# Patient Record
Sex: FEMALE | Race: BLACK OR AFRICAN AMERICAN | NOT HISPANIC OR LATINO | Employment: OTHER | URBAN - NONMETROPOLITAN AREA
[De-identification: names, ages, dates, MRNs, and addresses within clinical notes are randomized per-mention and may not be internally consistent; named-entity substitution may affect disease eponyms.]

---

## 2017-02-13 ENCOUNTER — OFFICE VISIT (OUTPATIENT)
Dept: OPHTHALMOLOGY | Facility: CLINIC | Age: 67
End: 2017-02-13

## 2017-02-13 DIAGNOSIS — Z96.1 PSEUDOPHAKIA: ICD-10-CM

## 2017-02-13 DIAGNOSIS — E11.9 DIABETES MELLITUS WITHOUT COMPLICATION (HCC): Primary | ICD-10-CM

## 2017-02-13 PROCEDURE — 92014 COMPRE OPH EXAM EST PT 1/>: CPT | Performed by: OPHTHALMOLOGY

## 2017-02-13 RX ORDER — VERAPAMIL HYDROCHLORIDE 40 MG/1
80 TABLET ORAL 3 TIMES DAILY
Refills: 1 | COMMUNITY
Start: 2017-01-20

## 2017-02-13 RX ORDER — NITROGLYCERIN 0.4 MG/1
0.4 TABLET SUBLINGUAL
Refills: 11 | COMMUNITY
Start: 2017-01-20

## 2017-02-13 NOTE — PROGRESS NOTES
Subjective   Laura West is a 67 y.o. female.   Chief Complaint   Patient presents with   • Diabetic Eye Exam   • Pseudophakia     HPI     Diabetic Eye Exam   Associated symptoms: Negative for blurred vision   Duration: years   Blood Sugars: is controlled       Last edited by Theodore Osorio MD on 2/13/2017 10:37 AM. (History)          Review of Systems    Objective   Visual Acuity (Snellen - Linear)      Right Left   Dist cc 20/40 -1 20/60   Near cc J1 J3       Correction:  Glasses         Wearing Rx      Sphere Cylinder Axis Add   Right [place]  180 +2.50   Left -1.25 +0.50 149 +2.50           Manifest Refraction      Sphere Cylinder Axis Dist Add   Right +0.75 Sphere  20/25 +2.50   Left [place] +0.50 060 20/25 +2.50            Final Rx      Sphere Cylinder Axis Add   Right +0.75 Sphere  +2.50   Left [place] +0.50 060 +2.50            Pupils      Pupils   Right PERRL   Left PERRL           Confrontational Visual Fields     Visual Fields      Left Right   Result Full Full                  Extraocular Movement      Right Left   Result Full Full                 Main Ophthalmology Exam     External Exam      Right Left    External Normal Normal      Slit Lamp Exam      Right Left    Lids/Lashes Normal Normal    Conjunctiva/Sclera White and quiet White and quiet    Cornea Clear Clear    Anterior Chamber Deep and quiet Deep and quiet    Iris Round and reactive Round and reactive    Lens Posterior chamber intraocular lens Posterior chamber intraocular lens    Vitreous Normal Asteroid hyalosis      Fundus Exam      Right Left    Disc Normal Normal    Macula Normal Normal    Vessels Normal Normal    Periphery Normal Normal                Assessment/Plan   Diagnoses and all orders for this visit:    Diabetes mellitus without complication    Pseudophakia    Eye disease risks with diabetes discussed  Glasses Rx given per refraction         Return in about 1 year (around 2/13/2018).

## 2017-09-13 ENCOUNTER — HOSPITAL ENCOUNTER (EMERGENCY)
Facility: HOSPITAL | Age: 67
Discharge: HOME | End: 2017-09-13
Attending: EMERGENCY MEDICINE | Admitting: EMERGENCY MEDICINE

## 2017-09-13 ENCOUNTER — APPOINTMENT (OUTPATIENT)
Dept: GENERAL RADIOLOGY | Facility: HOSPITAL | Age: 67
End: 2017-09-13

## 2017-09-13 VITALS
HEIGHT: 70 IN | HEART RATE: 76 BPM | RESPIRATION RATE: 18 BRPM | BODY MASS INDEX: 41.95 KG/M2 | DIASTOLIC BLOOD PRESSURE: 82 MMHG | SYSTOLIC BLOOD PRESSURE: 138 MMHG | TEMPERATURE: 98.7 F | WEIGHT: 293 LBS | OXYGEN SATURATION: 94 %

## 2017-09-13 DIAGNOSIS — K56.7 ILEUS (HCC): ICD-10-CM

## 2017-09-13 DIAGNOSIS — K59.04 CHRONIC IDIOPATHIC CONSTIPATION: Primary | ICD-10-CM

## 2017-09-13 LAB
ALBUMIN SERPL-MCNC: 4.4 G/DL (ref 3.4–4.8)
ALBUMIN/GLOB SERPL: 1.3 G/DL (ref 1.1–1.8)
ALP SERPL-CCNC: 73 U/L (ref 38–126)
ALT SERPL W P-5'-P-CCNC: 45 U/L (ref 9–52)
ANION GAP SERPL CALCULATED.3IONS-SCNC: 10 MMOL/L (ref 5–15)
AST SERPL-CCNC: 39 U/L (ref 14–36)
BASOPHILS # BLD AUTO: 0.01 10*3/MM3 (ref 0–0.2)
BASOPHILS NFR BLD AUTO: 0.1 % (ref 0–2)
BILIRUB SERPL-MCNC: 0.7 MG/DL (ref 0.2–1.3)
BUN BLD-MCNC: 21 MG/DL (ref 7–21)
BUN/CREAT SERPL: 18.9 (ref 7–25)
CALCIUM SPEC-SCNC: 9.5 MG/DL (ref 8.4–10.2)
CHLORIDE SERPL-SCNC: 99 MMOL/L (ref 95–110)
CO2 SERPL-SCNC: 29 MMOL/L (ref 22–31)
CREAT BLD-MCNC: 1.11 MG/DL (ref 0.5–1)
DEPRECATED RDW RBC AUTO: 48.2 FL (ref 36.4–46.3)
EOSINOPHIL # BLD AUTO: 0.08 10*3/MM3 (ref 0–0.7)
EOSINOPHIL NFR BLD AUTO: 0.7 % (ref 0–7)
ERYTHROCYTE [DISTWIDTH] IN BLOOD BY AUTOMATED COUNT: 15.2 % (ref 11.5–14.5)
GFR SERPL CREATININE-BSD FRML MDRD: 60 ML/MIN/1.73 (ref 45–104)
GLOBULIN UR ELPH-MCNC: 3.5 GM/DL (ref 2.3–3.5)
GLUCOSE BLD-MCNC: 120 MG/DL (ref 60–100)
HCT VFR BLD AUTO: 38.3 % (ref 35–45)
HGB BLD-MCNC: 12.5 G/DL (ref 12–15.5)
HOLD SPECIMEN: NORMAL
HOLD SPECIMEN: NORMAL
IMM GRANULOCYTES # BLD: 0.03 10*3/MM3 (ref 0–0.02)
IMM GRANULOCYTES NFR BLD: 0.3 % (ref 0–0.5)
LIPASE SERPL-CCNC: 28 U/L (ref 23–300)
LYMPHOCYTES # BLD AUTO: 0.7 10*3/MM3 (ref 0.6–4.2)
LYMPHOCYTES NFR BLD AUTO: 6.5 % (ref 10–50)
MCH RBC QN AUTO: 28.2 PG (ref 26.5–34)
MCHC RBC AUTO-ENTMCNC: 32.6 G/DL (ref 31.4–36)
MCV RBC AUTO: 86.3 FL (ref 80–98)
MONOCYTES # BLD AUTO: 0.67 10*3/MM3 (ref 0–0.9)
MONOCYTES NFR BLD AUTO: 6.3 % (ref 0–12)
NEUTROPHILS # BLD AUTO: 9.2 10*3/MM3 (ref 2–8.6)
NEUTROPHILS NFR BLD AUTO: 86.1 % (ref 37–80)
PLATELET # BLD AUTO: 147 10*3/MM3 (ref 150–450)
PMV BLD AUTO: 10.2 FL (ref 8–12)
POTASSIUM BLD-SCNC: 3.7 MMOL/L (ref 3.5–5.1)
PROT SERPL-MCNC: 7.9 G/DL (ref 6.3–8.6)
RBC # BLD AUTO: 4.44 10*6/MM3 (ref 3.77–5.16)
SODIUM BLD-SCNC: 138 MMOL/L (ref 137–145)
WBC NRBC COR # BLD: 10.69 10*3/MM3 (ref 3.2–9.8)
WHOLE BLOOD HOLD SPECIMEN: NORMAL
WHOLE BLOOD HOLD SPECIMEN: NORMAL

## 2017-09-13 PROCEDURE — 83690 ASSAY OF LIPASE: CPT | Performed by: EMERGENCY MEDICINE

## 2017-09-13 PROCEDURE — 80053 COMPREHEN METABOLIC PANEL: CPT | Performed by: EMERGENCY MEDICINE

## 2017-09-13 PROCEDURE — 85025 COMPLETE CBC W/AUTO DIFF WBC: CPT | Performed by: EMERGENCY MEDICINE

## 2017-09-13 PROCEDURE — 99284 EMERGENCY DEPT VISIT MOD MDM: CPT

## 2017-09-13 PROCEDURE — 96361 HYDRATE IV INFUSION ADD-ON: CPT

## 2017-09-13 PROCEDURE — 74020 HC XR ABDOMEN FLAT & UPRIGHT: CPT

## 2017-09-13 PROCEDURE — 96360 HYDRATION IV INFUSION INIT: CPT

## 2017-09-13 RX ORDER — SODIUM CHLORIDE 0.9 % (FLUSH) 0.9 %
10 SYRINGE (ML) INJECTION AS NEEDED
Status: DISCONTINUED | OUTPATIENT
Start: 2017-09-13 | End: 2017-09-14 | Stop reason: HOSPADM

## 2017-09-13 RX ORDER — HYDROCODONE BITARTRATE AND ACETAMINOPHEN 5; 325 MG/1; MG/1
1 TABLET ORAL EVERY 6 HOURS PRN
COMMUNITY

## 2017-09-13 RX ORDER — POLYETHYLENE GLYCOL 3350 17 G/17G
17 POWDER, FOR SOLUTION ORAL DAILY
Qty: 578 G | Refills: 0 | Status: SHIPPED | OUTPATIENT
Start: 2017-09-13

## 2017-09-13 RX ORDER — SODIUM CHLORIDE 9 MG/ML
1000 INJECTION, SOLUTION INTRAVENOUS ONCE
Status: COMPLETED | OUTPATIENT
Start: 2017-09-13 | End: 2017-09-13

## 2017-09-13 RX ORDER — POTASSIUM CHLORIDE 750 MG/1
10 TABLET, FILM COATED, EXTENDED RELEASE ORAL DAILY
COMMUNITY

## 2017-09-13 RX ORDER — METOCLOPRAMIDE 5 MG/1
5 TABLET ORAL 3 TIMES DAILY PRN
Qty: 20 TABLET | Refills: 0 | Status: SHIPPED | OUTPATIENT
Start: 2017-09-13

## 2017-09-13 RX ORDER — EZETIMIBE 10 MG/1
10 TABLET ORAL DAILY
COMMUNITY

## 2017-09-13 RX ADMIN — SODIUM CHLORIDE 1000 ML: 900 INJECTION, SOLUTION INTRAVENOUS at 20:36

## 2017-09-14 NOTE — ED PROVIDER NOTES
Subjective   HPI Comments: Patient notes constipation and a lower abdominal pain and constipation for three days.  Patient notes recent dietary changes and inability to stool.  States the stool is there but she cannot clear it.  Notes she can usually pass her stool, but she felt today she was dizzy and presyncopal with attempts to pass stool.  Became somewhat diaphroetic during the attempt.  No cp/sob.  Has hx of hernia problems for which she has had surgeries.  Has not had a bowel obstruction.        History provided by:  Patient   used: No        Review of Systems   Constitutional: Positive for diaphoresis. Negative for appetite change, chills and fever.   HENT: Negative.  Negative for congestion.    Eyes: Negative.  Negative for photophobia and visual disturbance.   Respiratory: Negative.  Negative for cough, chest tightness and shortness of breath.    Cardiovascular: Negative.  Negative for chest pain and palpitations.   Gastrointestinal: Positive for abdominal pain, constipation and nausea. Negative for diarrhea and vomiting.   Endocrine: Negative.    Genitourinary: Negative.  Negative for decreased urine volume, dysuria, flank pain and hematuria.   Musculoskeletal: Negative.  Negative for arthralgias, back pain, myalgias, neck pain and neck stiffness.   Skin: Negative.  Negative for pallor.   Neurological: Positive for dizziness and syncope. Negative for weakness, light-headedness, numbness and headaches.   Psychiatric/Behavioral: Negative.  Negative for confusion and suicidal ideas. The patient is not nervous/anxious.    All other systems reviewed and are negative.      Past Medical History:   Diagnosis Date   • Acute anterior uveitis     OD, improved      • Acute bronchitis    • Acute gouty arthropathy     left 1st MTP joint    • Acute sinusitis    • Artificial lens present     IN POSITION - OU   • Benign neoplasm of skin of eyelid      s/p excision hidrocystoma RLL   • Cellulitis     rt  distal arm mild      • Common cold    • Contusion    • Cortical senile cataract     OS   • Cough    • Dysuria    • Gout    • Gouty arthropathy    • Hand pain    • Hyperlipidemia    • Hypertension    • Knee pain    • Posterior subcapsular polar age-related cataract     OS   • Type 2 diabetes with complication    • Vertigo    • Vitreous floaters     asteroid hyalosis OS    • Vitreous opacities     asteroid OS          Allergies   Allergen Reactions   • Iodinated Diagnostic Agents      SHAKING   • Tamsulosin Other (See Comments)     Pt stated that she passes out when taking this medication.       Past Surgical History:   Procedure Laterality Date   • CATARACT EXTRACTION  07/24/2014    Remove cataract, insert lens (Left eye.)   • CATARACT EXTRACTION  06/12/2014    Remove cataract, insert lens (Right eye.)   • CATARACT EXTRACTION Bilateral    • EXCISION LESION  07/11/2014    EYELID EXCISION LESION 71919 (Benign neoplasm of skin of eyelid)    • INJECTION OF MEDICATION  03/09/2012    KENALOG   • OTHER SURGICAL HISTORY  07/17/2014    OPTICAL BIOMETRY 07212 (1)          History reviewed. No pertinent family history.    Social History     Social History   • Marital status:      Spouse name: N/A   • Number of children: N/A   • Years of education: N/A     Social History Main Topics   • Smoking status: Never Smoker   • Smokeless tobacco: None   • Alcohol use No   • Drug use: No   • Sexual activity: Defer     Other Topics Concern   • None     Social History Narrative   • None           Objective   Physical Exam   Constitutional: She is oriented to person, place, and time. She appears well-developed and well-nourished. No distress.   HENT:   Head: Normocephalic and atraumatic.   Nose: Nose normal.   Mouth/Throat: Oropharynx is clear and moist.   Eyes: Conjunctivae and EOM are normal. No scleral icterus.   Neck: Normal range of motion. Neck supple. No JVD present.   Cardiovascular: Normal rate, regular rhythm, normal heart  sounds and intact distal pulses.  Exam reveals no gallop and no friction rub.    No murmur heard.  Pulmonary/Chest: Effort normal. No respiratory distress. She has no wheezes. She has no rales. She exhibits no tenderness.   Abdominal: Soft. She exhibits no distension and no mass. There is tenderness (mild diffuse). There is no rebound and no guarding.   Musculoskeletal: Normal range of motion. She exhibits no edema, tenderness or deformity.   Lymphadenopathy:     She has no cervical adenopathy.   Neurological: She is alert and oriented to person, place, and time. No cranial nerve deficit. She exhibits normal muscle tone.   Skin: Skin is warm. No rash noted. She is diaphoretic (mild). No erythema. No pallor.   Psychiatric: She has a normal mood and affect. Her behavior is normal. Judgment and thought content normal.   Nursing note and vitals reviewed.      Procedures         ED Course  ED Course      Labs Reviewed   COMPREHENSIVE METABOLIC PANEL - Abnormal; Notable for the following:        Result Value    Glucose 120 (*)     Creatinine 1.11 (*)     AST (SGOT) 39 (*)     All other components within normal limits   CBC WITH AUTO DIFFERENTIAL - Abnormal; Notable for the following:     WBC 10.69 (*)     RDW 15.2 (*)     RDW-SD 48.2 (*)     Platelets 147 (*)     Neutrophil % 86.1 (*)     Lymphocyte % 6.5 (*)     Neutrophils, Absolute 9.20 (*)     Immature Grans, Absolute 0.03 (*)     All other components within normal limits   LIPASE - Normal   RAINBOW DRAW    Narrative:     The following orders were created for panel order [place] Draw.  Procedure                               Abnormality         Status                     ---------                               -----------         ------                     Light Blue Top[[id number]]                                   Final result               Green Top (Gel)[[id number]]                                  Final result               Lavender Top[[id number]]                                      Final result               Gold Top - [facility][[id number]]                                   Final result                 Please view results for these tests on the individual orders.   URINALYSIS W/ CULTURE IF INDICATED   CBC AND DIFFERENTIAL    Narrative:     The following orders were created for panel order CBC & Differential.  Procedure                               Abnormality         Status                     ---------                               -----------         ------                     CBC Auto Differential[[id number]]        Abnormal            Final result                 Please view results for these tests on the individual orders.   LIGHT BLUE TOP   GREEN TOP   LAVENDER TOP   GOLD TOP - SST       XR Abdomen Flat & Upright   Final Result   CONCLUSION:   Air-fluid levels in the small and large bowel suggesting an   adynamic ileus.   Possible 1.3 cm calculus proximal left ureter at the L2-3 level.      [id number]      Electronically signed by:  Lawson Tong MD  9/13/2017 8:40 PM CDT   Workstation: [id number]        Patient manually disimpacted in the ED with Melissa.  Some mild bleeding at the rectum during procedure, <2 cc secondary to manipulation.  Large amount of stool disimpacted from the area.  Patient tolerated the procedure well.  Will discharge to outpatient followup.              [facility]    Final diagnoses:   Chronic idiopathic constipation   Ileus            Evans Nunes MD  09/13/17 [id number]

## 2017-09-14 NOTE — DISCHARGE INSTRUCTIONS
Drink plenty of fluids, high fiber diet.  Miralax daily.  Return with any new or worsening symptoms, or any concerns.

## 2019-05-21 ENCOUNTER — TRANSCRIBE ORDERS (OUTPATIENT)
Dept: OCCUPATIONAL THERAPY | Facility: HOSPITAL | Age: 69
End: 2019-05-21

## 2019-05-21 ENCOUNTER — TRANSCRIBE ORDERS (OUTPATIENT)
Dept: PHYSICAL THERAPY | Facility: HOSPITAL | Age: 69
End: 2019-05-21

## 2019-05-21 DIAGNOSIS — R53.1 GENERALIZED WEAKNESS: ICD-10-CM

## 2019-05-21 DIAGNOSIS — R29.6 FREQUENT FALLS: Primary | ICD-10-CM

## 2019-06-03 ENCOUNTER — HOSPITAL ENCOUNTER (OUTPATIENT)
Dept: OCCUPATIONAL THERAPY | Facility: HOSPITAL | Age: 69
Setting detail: THERAPIES SERIES
Discharge: HOME | End: 2019-06-03

## 2019-06-03 ENCOUNTER — HOSPITAL ENCOUNTER (OUTPATIENT)
Dept: PHYSICAL THERAPY | Facility: HOSPITAL | Age: 69
Setting detail: THERAPIES SERIES
Discharge: HOME | End: 2019-06-03

## 2019-06-03 DIAGNOSIS — Z78.9 IMPAIRED MOBILITY AND ADLS: ICD-10-CM

## 2019-06-03 DIAGNOSIS — R53.1 GENERALIZED WEAKNESS: Primary | ICD-10-CM

## 2019-06-03 DIAGNOSIS — R53.1 GENERALIZED WEAKNESS: ICD-10-CM

## 2019-06-03 DIAGNOSIS — Z74.09 IMPAIRED MOBILITY AND ADLS: ICD-10-CM

## 2019-06-03 DIAGNOSIS — R29.6 FREQUENT FALLS: Primary | ICD-10-CM

## 2019-06-03 PROCEDURE — 97162 PT EVAL MOD COMPLEX 30 MIN: CPT | Performed by: PHYSICAL THERAPIST

## 2019-06-03 PROCEDURE — 97166 OT EVAL MOD COMPLEX 45 MIN: CPT

## 2019-06-03 NOTE — THERAPY EVALUATION
Outpatient Physical Therapy Ortho Initial Evaluation  [facility]     Patient Name: Laura West  : 1950  MRN: [id number]  Today's Date: 6/3/2019      Visit Date: 2019      Subjective Evaluation    History of Present Illness  Mechanism of injury: Falls    Subjective comment: 4 falls this year  Feb and  dizzy and fell backward off a step.  Admitted in ED with AFib 3/19 for OBV. Change in medication has helped the dizziness. 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 Quality of life: good    Pain  Current pain ratin  At worst pain ratin  Location: across back  Quality: dull ache  Relieving factors: rest  Aggravating factors: ambulation and standing  Progression: no change    Social Support  Lives in: one-story house  Lives with: spouse    Hand dominance: right    Patient Goals  Patient goals for therapy: improved balance and increased strength          Patient Active Problem List   Diagnosis   • Pseudophakia   • Diabetes mellitus without complication (CMS/HCC)        Past Medical History:   Diagnosis Date   • Acute anterior uveitis     OD, improved      • Acute bronchitis    • Acute gouty arthropathy     left 1st MTP joint    • Acute sinusitis    • Artificial lens present     IN POSITION - OU   • Benign neoplasm of skin of eyelid      s/p excision hidrocystoma RLL   • Cellulitis     rt distal arm mild      • Common cold    • Contusion    • Cortical senile cataract     OS   • Cough    • Dysuria    • Gout    • Gouty arthropathy    • Hand pain    • Hyperlipidemia    • Hypertension    • Knee pain    • Posterior subcapsular polar age-related cataract     OS   • Type 2 diabetes with complication (CMS/HCC)    • Vertigo    • Vitreous floaters     asteroid hyalosis OS    • Vitreous opacities     asteroid OS           Past Surgical History:   Procedure Laterality Date   • CATARACT EXTRACTION  2014    Remove  cataract, insert lens (Left eye.)   • CATARACT EXTRACTION  06/12/2014    Remove cataract, insert lens (Right eye.)   • CATARACT EXTRACTION Bilateral    • EXCISION LESION  07/11/2014    EYELID EXCISION LESION 09082 (Benign neoplasm of skin of eyelid)    • INJECTION OF MEDICATION  03/09/2012    KENALOG   • OTHER SURGICAL HISTORY  07/17/2014    OPTICAL BIOMETRY 64588 (1)      Pacemaker implant 2015, lead wire change 2017    Medications (Admitted on 6/3/2019)    albuterol (PROVENTIL HFA;VENTOLIN HFA) 108 (90 BASE)  MCG/ACT inhaler    allopurinol (ZYLOPRIM) 300 MG tablet    aspirin 81 MG tablet    difluprednate (DUREZOL) 0.05 % ophthalmic emulsion    Ferrous gluconate    ezetimibe (ZETIA) 10 MG tablet    fluticasone (FLONASE) 50 MCG/ACT nasal spray    furosemide (LASIX) 20 MG tablet PRN   HYDROcodone-acetaminophen (NORCO) 5-325 MG per  tablet    hydrocortisone 0.5 % cream    lisinopril (PRINIVIL,ZESTRIL) 10 MG tablet    magnesium oxide (MAGOX) 400 (241.3 Mg) MG tablet tablet loratidine   Menthol, Topical Analgesic, 4 % gel    metoclopramide (REGLAN) 5 MG tablet    Nepafenac (ILEVRO) 0.3 % suspension    NITROSTAT 0.4 MG SL tablet    NYSTATIN PO    pantoprazole (PROTONIX) 40 MG EC tablet    polyethylene glycol (MIRALAX) powder    potassium chloride (K-DUR) 10 MEQ CR tablet    rosuvastatin (CRESTOR) 20 MG tablet    nitroglycerine   escitaloprom    verapamil (CALAN) 80 MG tablet    bupronpnl HCL  ALLERGIES: Iodinated diagnostic agents, Tamsulosin  Visit Dx:     ICD-10-CM ICD-9-CM   1. Frequent falls R29.6 V15.88   2. Generalized weakness R53.1 780.79       contraindications: pace maker no modalities.    Objective  Presents with wrist forearm splint from injury to tendons.   States she has a cane but it is in the car.   Obese, deconditioned female.   Ambluates in ballet flat dress shoes.  standing balance 1  Min 56 sec with LOB backward  SLS Left 7 sec/ R 2 sec  MMT Right 5/5 except hip abd 4, ext 3-  Left Hip flexion 4,  hamstring 4+, hip abd 4-, ext 3-  Large abdominal hernia with strain to sit up and SLR.      Therapy Education  Education Details: pt was given clamshells and iso add of hips for HEP  Given: HEP  Program: New  How Provided: Verbal, Written  Level of Understanding: Verbalized    Assessment/Plan     PT OP Goals     Row Name 06/03/19 0900          PT Short Term Goals    STG Date to Achieve  06/24/19  -DD     STG 1  Patient will be independent home exercise program  -DD     STG 2  Patient to tolerate 45 minutes exercise program  -DD     STG 3  Patient to have hip flexor MMT L 4+  -DD     STG 4  Patient have hip abductor MMT Jas Left 4, R 4+  -DD     STG 5  Patient have hip ext MMT 3/5  -DD        Long Term Goals    LTG 1  Patient have hamstring MMT 5/5 left  -DD     LTG 2  Pt to not report any further falls  -DD        Time Calculation    PT Goal Re-Cert Due Date  06/24/19  -DD       User Key  (r) = Recorded By, (t) = Taken By, (c) = Cosigned By    Initials Name Provider Type    DD Daniela Alba, PT DPT Physical Therapist          PT Assessment/Plan     Row Name 06/03/19 1051          PT Assessment    Functional Limitations  Decreased safety during functional activities;Impaired gait;Limitations in community activities;Performance in leisure activities;Performance in self-care ADL;Limitation in home management  -DD     Impairments  Gait;Endurance;Balance;Pain;Muscle strength  -DD     Assessment Comments  Pt has LE weakness but no further falls since  March and Medication changes.  Balance is altered with SLS. Pt would benefit from PT for gait and balance.  -DD     Please refer to paper survey for additional self-reported information  No  -DD     Rehab Potential  Good  -DD     Patient/caregiver participated in establishment of treatment plan and goals  Yes  -DD     Patient would benefit from skilled therapy intervention  Yes  -DD        PT Plan    PT Frequency  2x/week  -DD     Predicted Duration of Therapy  Intervention (Therapy Eval)  6 weeks  -DD     Planned CPT's?  PT EVAL MOD COMPLELITY: 20546;PT THER PROC EA 15 MIN: 90745;PT GAIT TRAINING EA 15 MIN: 01786;PT HOT OR COLD PACK TREAT MCARE  -DD     Physical Therapy Interventions (Optional Details)  gait training;lumbar stabilization;strengthening;stretching;home exercise program  -DD     PT Plan Comments  core and LE strengthening, balance and gait exercises.  -DD       User Key  (r) = Recorded By, (t) = Taken By, (c) = Cosigned By    Initials Name Provider Type    DD Daniela Alba, PT DPT Physical Therapist          Time Calculation:     Start Time: 0941  Stop Time: 1030  Time Calculation (min): 49 min  Total Timed Code Minutes- PT: 0 minute(s)     Therapy Charges for Today     Code Description Service Date Service Provider Modifiers Qty    [id number]  PT EVAL MOD COMPLEXITY 3 6/3/2019 Daniela Alba, PT DPT GP 1          Daniela HILTON. Anjali PT DPT  6/3/2019

## 2019-06-03 NOTE — THERAPY EVALUATION
Outpatient Occupational Therapy Rehab Program Initial Evaluation  [facility]     Patient Name: Laura West  : 1950  MRN: [id number]  Today's Date: 6/3/2019      Visit Date: 2019   Visit Number: 1  Recert Date: 2019  % Improvement: TBD  MD visit date: TBD      Total Insurance visits approved: [employer]      Patient Active Problem List   Diagnosis   • Pseudophakia   • Diabetes mellitus without complication (CMS/HCC)        Past Medical History:   Diagnosis Date   • Acute anterior uveitis     OD, improved      • Acute bronchitis    • Acute gouty arthropathy     left 1st MTP joint    • Acute sinusitis    • Artificial lens present     IN POSITION - OU   • Benign neoplasm of skin of eyelid      s/p excision hidrocystoma RLL   • Cellulitis     rt distal arm mild      • Common cold    • Contusion    • Cortical senile cataract     OS   • Cough    • Dysuria    • Gout    • Gouty arthropathy    • Hand pain    • Hyperlipidemia    • Hypertension    • Knee pain    • Posterior subcapsular polar age-related cataract     OS   • Type 2 diabetes with complication (CMS/HCC)    • Vertigo    • Vitreous floaters     asteroid hyalosis OS    • Vitreous opacities     asteroid OS           Past Surgical History:   Procedure Laterality Date   • CATARACT EXTRACTION  2014    Remove cataract, insert lens (Left eye.)   • CATARACT EXTRACTION  2014    Remove cataract, insert lens (Right eye.)   • CATARACT EXTRACTION Bilateral    • EXCISION LESION  2014    EYELID EXCISION LESION 94705 (Benign neoplasm of skin of eyelid)    • INJECTION OF MEDICATION  2012    KENALOG   • OTHER SURGICAL HISTORY  2014    OPTICAL BIOMETRY 73705 (1)            Visit Dx:    ICD-10-CM ICD-9-CM   1. Generalized weakness R53.1 780.79   2. Impaired mobility and ADLs Z74.09 799.89       Patient History     Row Name 19 1100             History    Date Current Problem(s) Began  19  -AB      Brief Description of  Current Complaint  Pt reports she began losing balance, bumping into objects, and hitting things around the beginning of 2017. She also states she was lightheaded and dizzy. She reports she experiences pain in neck, shoulders, lower back, BUEs, hands, kneeds, and feet daily. Pt reports diagnosis of COPD approximately 3 years ago. Pt reports she was diagnosed with kidney stones last week; pt reports she passed the stones. Pt fell on 3/19/2019; she fell backwards going up step onto plastic bottles. She received PT and OT while in the hospital.   -AB      Patient/Caregiver Goals  Improve strength;Know what to do to help the symptoms;Relieve pain  -AB      Hand Dominance  ambidextrous  -AB      Occupation/sports/leisure activities  Enjoys outside activities, gardening  -AB         Pain     Pain Location  Generalized  -AB      Pain at Present  4  -AB      Pain at Best  0  -AB      Pain at Worst  10  -AB      Pain Frequency  Constant/continuous  -AB      Pain Description  Aching;Cramping;Dull;Pins and needles;Pressure;Shooting  -AB      Difficulties at work?  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  Yes  -AB      Difficulties with recreational activities?  Yes  -AB         Fall Risk Assessment    Any falls in the past year:  Yes  -AB      Number of falls reported in the last 12 months  2  -AB      Factors that contributed to the fall:  Lost balance  -AB        User Key  (r) = Recorded By, (t) = Taken By, (c) = Cosigned By    Initials Name Provider Type    AB Shen Barrera OT Occupational Therapist            OT Neuro     Row Name 06/03/19 1100             Precautions and Contraindications    Precautions/Limitations  fall precautions  -AB         Home Living    Living Environment Comment  Pt is able to access living environment; however, has difficulty getting up from commode. Pt lives with a SO and son  -AB         Cognitive Assessment/Intervention    Current  Cognitive/Communication Assessment  functional  -AB      Orientation Status (Cognition)  oriented x 4;person;place;situation;time  -AB         Sensation    Sensation WNL?  WFL  -AB         Coordination    Diadochokinesis  Bilteral:;Yes Increased time required to complete task  -AB      Coordination Tests  Finger to nose eyes closed;9-Hole Peg  -AB      Finger to Nose Eyes Closed  Bilteral:;Impaired  -AB         General ROM    GENERAL ROM COMMENTS  BUE general WFL; LUE impaired extension  -AB         MMT (Manual Muscle Testing)    Rt Upper Ext  Rt Shoulder Flexion;Rt Shoulder Extension;Rt Elbow Flexion;Rt Elbow Extension;Rt Forearm Supination;Rt Forearm Pronation;Rt Wrist Flexion;Rt Wrist Extension;Rt Shoulder Internal Rotation;Rt Shoulder External Rotation  -AB      Lt Upper Ext  Lt Shoulder Flexion;Lt Shoulder Extension;Lt Shoulder Internal Rotation;Lt Shoulder External Rotation;Lt Elbow Extension;Lt Elbow Flexion;Lt Forearm Supination;Lt Forearm Pronation;Lt Wrist Flexion;Lt Wrist Extension  -AB         MMT Right Upper Ext    Rt Shoulder Flexion MMT, Gross Movement  (4+/5) good plus  -AB      Rt Shoulder Extension MMT, Gross Movement  (4+/5) good plus  -AB      Rt Shoulder Internal Rotation MMT, Gross Movement  (3+/5) fair plus  -AB      Rt Shoulder External Rotation MMT, Gross Movement  (3+/5) fair plus  -AB      Rt Elbow Flexion MMT, Gross Movement:  (3-/5) fair minus  -AB      Rt Elbow Extension MMT, Gross Movement:  (4-/5) good minus  -AB      Rt Forearm Supination MMT, Gross Movement  (4-/5) good minus  -AB      Rt Forearm Pronation MMT, Gross Movement  (3+/5) fair plus  -AB      Rt Wrist Flexion MMT, Gross Movement  (5/5) normal  -AB      Rt Wrist Extension MMT, Gross Movement  (5/5) normal  -AB         MMT Left Upper Ext    Lt Shoulder Flexion MMT, Gross Movement  (3-/5) fair minus  -AB      Lt Shoulder Extension MMT, Gross Movement  (3-/5) fair minus  -AB      Lt Shoulder Internal Rotation MMT, Gross  Movement  (3+/5) fair plus  -AB      Lt Shoulder External Rotation MMT, Gross Movement  (3+/5) fair plus  -AB      Lt Elbow Flexion MMT, Gross Movement  (4-/5) good minus  -AB      Lt Elbow Extension MMT, Gross Movement  (3-/5) fair minus  -AB      Lt Forearm Supination MMT, Gross Movement  (4-/5) good minus  -AB      Lt Forearm Pronation MMT, Gross Movement  (3+/5) fair plus  -AB      Lt Wrist Flexion MMT, Gross Movement  (5/5) normal  -AB      Lt Wrist Extension MMT, Gross Movement  (5/5) normal  -AB         Bed Mobility    Bed Mobility, Comment  Increased time required and requires the use of a bed rail to move out of bed.  -AB         Transfers    Transfer, Comment  Increased difficulty with toilet transfers.   -AB         Functional Mobility    Functional Mobility- Comment  Pt states she uses a cane for mobility. Pt not seen with cane this date.  -AB         ADL Assessment/Intervention    ADL's Assessed?  Upper Body Bathing;Lower Body Bathing;Upper Body Dressing;Toileting;Lower Body Dressing;Grooming  -AB         Bathing Assessment/Intervention    Comment (Bathing)  Pt showers independently in standing position; however occasionally experiences dizziness in shower. Pt occasionally uses a stool to sit on in shower that she purchased from [employer].   -AB         Upper Body Dressing Assessment/Training    Upper Body Dressing [place] Level  minimum assist (75% patient effort)  -AB      Comment (Upper Body Dressing)  Pt requires assistance to fasten bra.  -AB         Lower Body Dressing Assessment/Training    Lower Body Dressing [place] Level  minimum assist (75% patient effort)  -AB      Comment (Lower Body Dressing)  Pt requires occasional assistance to pull on stockings.  -AB         Toileting Assessment/Training    Comment (Toileting)  Increased difficulty completing toileting tasks d/t decreased balance.  -AB         Grooming Assessment/Training    Comment (Grooming)  Increased fatigue and BUE weakness  during grooming tasks such as doing her hair and makeup.  -AB        User Key  (r) = Recorded By, (t) = Taken By, (c) = Cosigned By    Initials Name Provider Type    Shen Murray OT Occupational Therapist                  Therapy Education  Education Details: POC  Given: Other (comment)  Program: New  How Provided: Verbal  Provided to: Patient  Level of Understanding: Verbalized    OT Goals     Row Name 06/03/19 1232 06/03/19 1200       OT Short Term Goals    STG Date to Achieve  --  06/24/19  -AB    STG 1  --  Pt will report compliance with HEP 3/3 times.  -AB    STG 1 Progress  --  New  -AB    STG 2  --  Pt will complete 9 hole peg test to establish FMC baseline.  -AB    STG 2 Progress  --  New  -AB    STG 3  --  Pt will complete  assessment to determine deficits with  strength  -AB    STG 3 Progress  --  New  -AB    STG 4  --  Pt will complete pinch assessment to determine deficits with pinch strength.  -AB    STG 4 Progress  --  New  -AB    STG 5  --  Pt will engage in 15 min BUE exercises with 3 rest breaks as needed 2/3 times to improve BUE strength and activity tolerance for ADLs and IADLs.  -AB    STG 5 Progress  --  New  -AB       Long Term Goals    LTG Date to Achieve  --  07/24/19  -AB    LTG 1  --  Pt will improve LUE shoulder flexion and extension strength to 4+/5 during MMT 3 times to improve strength and independence for ADLs and IADLs.  -AB    LTG 1 Progress  --  New  -AB    LTG 2  --  Pt will improve BUE elbow flexion and extension to 4+/5 during MMT 3 times to improve strength for ADLs and IADLs.  -AB    LTG 2 Progress  --  New  -AB    LTG 3  --  Pt will engage in 15 min BUE exercises with 0-1 rest breaks to improve activity tolerance and BUE strength for ADLs and IADLs.  -AB    LTG 3 Progress  --  New  -AB    LTG 4  --  Pt will engage in BUE FMC task while standing for 8 min 2/3 times to improve functional independence during ADLs and IADLs.  -AB    LTG 4 Progress  --  New  -AB        Time Calculation    OT Goal Re-Cert Due Date  06/24/19  -AB  06/24/19  -AB      User Key  (r) = Recorded By, (t) = Taken By, (c) = Cosigned By    Initials Name Provider Type    Shen Murray OT Occupational Therapist        OT Assessment/Plan     Row Name 06/03/19 1200 06/03/19 1051       OT Assessment    Functional Limitations  Decreased safety during functional activities;Limitation in home management;Limitations in functional capacity and performance;Limitations in community activities;Performance in leisure activities;Performance in self-care ADL  -AB  --    Impairments  Balance;Coordination;Dexterity;Endurance;Impaired muscle endurance;Impaired muscle power;Motor function;Muscle strength;Pain;Posture;Poor body mechanics;Range of motion;Joint integrity  -AB  --    Assessment Comments  OT evaluation completed this date. Pt is a 70 y/o female presenting this date with generalized weakness, pain, and frequent falls. Pt demonstrates significantly impaired BUE strength and activity tolerance. She requires increased time to complete ADLs and reports she has been unable to participate in meaningful outdoor activities. She c/o pain daily and expressed increased pain during BUE AROM assessment. Despite pain, pt's BUE AROM was WFL. Pt reports difficulty maintaining a grasp on larger cups, cookware items, and other objects. She has increased difficulty completing ADLs, specifically when BUE bilateral coordination is required. Pt would benefit from skilled OT services to address these deficits and improve independence and performance in ADLs and IADLs.  -AB  --    OT Diagnosis  impaired mobility and ADLs; generalized weakness  -AB  --    OT Rehab Potential  Excellent  -AB  --    Patient/caregiver participated in establishment of treatment plan and goals  Yes  -AB  --    Patient would benefit from skilled therapy intervention  Yes  -AB  --       OT Plan    OT Frequency  2x/week  -AB  --    Predicted Duration of  Therapy Intervention (Therapy Eval)  6-8 weeks w/ further TBD  -AB  6 weeks  -DD    Planned CPT's?  OT EVAL MOD COMPLEXITY: 79883;OT THER ACT EA 15 MIN: 31082OP;OT THER PROC EA 15 MIN: 07823HH;OT SELF CARE/MGMT/TRAIN 15 MIN: 49340;OT HOT/COLD PACK;OT PARAFFIN BATH: 82709ZN;OT CARE PLAN EA 15 MIN;OT THER SUPP EA 15 MIN:  -AB  --    Planned Therapy Interventions (Optional Details)  home exercise program;bed mobility training;motor coordination training;patient/family education;strengthening;stretching;other (see comments) AD/AE training as needed  -AB  --    OT Plan Comments  Pt would benefit from skilled OT services through BUE therapeutic exercises to improve BUE strength and activity tolerance for ADLs and IADLs. She would also benefit from therapeutic activities to improve FMC and bilateral coordination to increase independence and participation in ADLs and IADLs.  -AB  --      User Key  (r) = Recorded By, (t) = Taken By, (c) = Cosigned By    Initials Name Provider Type    DD Daniela Alba, PT DPT Physical Therapist    AB Shen Barrera, OT Occupational Therapist                        Time Calculation:   OT Start Time: 1104  OT Stop Time: 1203  OT Time Calculation (min): 59 min     Therapy Charges for Today     Code Description Service Date Service Provider Modifiers Qty    [id number]  OT EVAL MOD COMPLEXITY 4 6/3/2019 Shen Barrera, OT GO 1                  Shen Barrera OT  6/3/2019

## 2019-06-06 ENCOUNTER — APPOINTMENT (OUTPATIENT)
Dept: OCCUPATIONAL THERAPY | Facility: HOSPITAL | Age: 69
End: 2019-06-06

## 2019-06-10 ENCOUNTER — APPOINTMENT (OUTPATIENT)
Dept: PHYSICAL THERAPY | Facility: HOSPITAL | Age: 69
End: 2019-06-10

## 2019-06-10 ENCOUNTER — APPOINTMENT (OUTPATIENT)
Dept: OCCUPATIONAL THERAPY | Facility: HOSPITAL | Age: 69
End: 2019-06-10

## 2019-06-12 ENCOUNTER — APPOINTMENT (OUTPATIENT)
Dept: PHYSICAL THERAPY | Facility: HOSPITAL | Age: 69
End: 2019-06-12

## 2019-06-12 ENCOUNTER — APPOINTMENT (OUTPATIENT)
Dept: OCCUPATIONAL THERAPY | Facility: HOSPITAL | Age: 69
End: 2019-06-12

## 2019-06-17 ENCOUNTER — HOSPITAL ENCOUNTER (OUTPATIENT)
Dept: PHYSICAL THERAPY | Facility: HOSPITAL | Age: 69
Setting detail: THERAPIES SERIES
Discharge: HOME | End: 2019-06-17

## 2019-06-17 ENCOUNTER — HOSPITAL ENCOUNTER (OUTPATIENT)
Dept: OCCUPATIONAL THERAPY | Facility: HOSPITAL | Age: 69
Setting detail: THERAPIES SERIES
Discharge: HOME | End: 2019-06-17

## 2019-06-17 DIAGNOSIS — R53.1 GENERALIZED WEAKNESS: Primary | ICD-10-CM

## 2019-06-17 DIAGNOSIS — Z78.9 IMPAIRED MOBILITY AND ADLS: ICD-10-CM

## 2019-06-17 DIAGNOSIS — Z74.09 IMPAIRED MOBILITY AND ADLS: ICD-10-CM

## 2019-06-17 DIAGNOSIS — R53.1 GENERALIZED WEAKNESS: ICD-10-CM

## 2019-06-17 DIAGNOSIS — R29.6 FREQUENT FALLS: Primary | ICD-10-CM

## 2019-06-17 PROCEDURE — 97530 THERAPEUTIC ACTIVITIES: CPT

## 2019-06-17 PROCEDURE — 97110 THERAPEUTIC EXERCISES: CPT

## 2019-06-17 NOTE — THERAPY TREATMENT NOTE
Outpatient Occupational Therapy Rehab Program Treatment  [facility]     Patient Name: Laura West  : 1950  MRN: [id number]  Today's Date: 2019        Visit Date: 2019  Visit Number:   Recert Date: 2019  % Improvement: TBD  MD visit date: TBD      Total Insurance visits approved: Med Nec; 16 visits    Patient Active Problem List   Diagnosis   • Pseudophakia   • Diabetes mellitus without complication (CMS/HCC)        Past Medical History:   Diagnosis Date   • Acute anterior uveitis     OD, improved      • Acute bronchitis    • Acute gouty arthropathy     left 1st MTP joint    • Acute sinusitis    • Artificial lens present     IN POSITION - OU   • Benign neoplasm of skin of eyelid      s/p excision hidrocystoma RLL   • Cellulitis     rt distal arm mild      • Common cold    • Contusion    • Cortical senile cataract     OS   • Cough    • Dysuria    • Gout    • Gouty arthropathy    • Hand pain    • Hyperlipidemia    • Hypertension    • Knee pain    • Posterior subcapsular polar age-related cataract     OS   • Type 2 diabetes with complication (CMS/HCC)    • Vertigo    • Vitreous floaters     asteroid hyalosis OS    • Vitreous opacities     asteroid OS           Past Surgical History:   Procedure Laterality Date   • CATARACT EXTRACTION  2014    Remove cataract, insert lens (Left eye.)   • CATARACT EXTRACTION  2014    Remove cataract, insert lens (Right eye.)   • CATARACT EXTRACTION Bilateral    • EXCISION LESION  2014    EYELID EXCISION LESION 90846 (Benign neoplasm of skin of eyelid)    • INJECTION OF MEDICATION  2012    KENALOG   • OTHER SURGICAL HISTORY  2014    OPTICAL BIOMETRY 63981 (1)            Visit Dx:    ICD-10-CM ICD-9-CM   1. Generalized weakness R53.1 780.79   2. Impaired mobility and ADLs Z74.09 799.89         OT Neuro     Row Name 19 1100             Subjective Comments    Subjective Comments  Pt present following PT session this  "date. When standing from seated position at end of OT tx session, pt reports she felt like her body \"wants to keep going forward and the backward.\" OT educated pt on safety awareness and to hold on to cane.  -AB         Precautions and Contraindications    Precautions/Limitations  fall precautions  -AB      Contraindications  No lifting more than 5#  -AB         Subjective Pain    Able to rate subjective pain?  yes  -AB      Pre-Treatment Pain Level  3  -AB      Post-Treatment Pain Level  3  -AB      Subjective Pain Comment  Neck, back pre and post-tx session; R bicep post-tx session  -AB         Cognitive Assessment/Intervention    Current Cognitive/Communication Assessment  functional  -AB      Orientation Status (Cognition)  oriented x 4  -AB         Sensation    Sensation WNL?  WFL  -AB         Coordination    Coordination Tests  9-Hole Peg  -AB      9-Hole Peg Left  29.47 sec  -AB      9-Hole Peg Right  31.87 sec 1 drop  -AB         Gross Motor Training    Gross Motor Skill, Impairments Detail  Pt reports increased difficulty grasping glass cups at home and maintaining  in bilateral hands.  -AB         Functional Mobility    Functional Mobility- Comment  Pt ambulated from lobby to OT room with standard cane.  -AB        User Key  (r) = Recorded By, (t) = Taken By, (c) = Cosigned By    Initials Name Provider Type    AB Shen Barrera OT Occupational Therapist           Hand Therapy (last 24 hours)      Hand Eval     Row Name 06/17/19 1100             Subjective Pain    Able to rate subjective pain?  yes  -AB      Pre-Treatment Pain Level  3  -AB      Subjective Pain Comment  neck and back  -AB         Hand  Strength     Strength Affected Side  Bilateral  -AB          Strength Right    # Reps  3  -AB      Right Rung  2  -AB      Right  Test 1  55  -AB      Right  Test 2  40  -AB      Right  Test 3  42  -AB       Strength Average Right  45.67  -AB          Strength Left    # " Reps  3  -AB      Left Rung  2  -AB      Left  Test 1  33  -AB      Left  Test 2  34  -AB      Left  Test 3  33  -AB       Strength Average Left  33.33  -AB         Pinch Strength    Affected Side  Left  -AB         Right Hand Strength - Pinch (lbs)    Lateral  14 lbs  -AB      Tip (2 point)  11 lbs  -AB      Tip (3 point)  9 lbs  -AB         Left Hand Strength - Pinch (lbs)    Lateral  11 lbs  -AB      Tip (2 point)  9 lbs  -AB      Tip (3 point)  10 lbs  -AB        User Key  (r) = Recorded By, (t) = Taken By, (c) = Cosigned By    Initials Name Provider Type    AB Shen Barrera, OT Occupational Therapist                Therapy Education  Education Details: HEP: BUE therapeutic exercises  Given: HEP  Program: New  How Provided: Demonstration, Verbal  Provided to: Patient  Level of Understanding: Verbalized, Teach back education performed    OT Assessment/Plan     Row Name 06/17/19 1200 06/17/19 1104       OT Assessment    Assessment Comments  OT tx session tolerated well. Pt completed 9 hole peg test this date and displays impaired FMC in bilateral hands. She also presents with impaired  strength in L hand. She engaged in BUE therapeutic exercises and tolerated fair. Pt c/o bilateral hands shaking after bicep curls; no further BUE exercises were completed while OTR maintained close observation to pt's bilateral hands for remaining tx session. She reports increased difficulty maintaining a grasp when holding drinking glasses. Continue skilled OT services to address these deficits.  -AB  --       OT Plan    OT Frequency  2x/week  -AB  --    Predicted Duration of Therapy Intervention (Therapy Eval)  6-8 weeks w/further TBD  -AB  6 weeks  -CP    OT Plan Comments  Progress with skilled OT services through BUE therapeutic exercises to improve BUE strength and activity tolerance for ADLs and IADLs. She would also benefit from therapeutic activities to improve FMC and bilateral coordination to  increase independence and participation in ADLs and IADLs.  -AB  --      User Key  (r) = Recorded By, (t) = Taken By, (c) = Cosigned By    Initials Name Provider Type    Piedad Mathews, PTA Physical Therapy Assistant    Shen Murray, OT Occupational Therapist           OT Goals     Row Name 06/17/19 1209 06/17/19 1104       OT Short Term Goals    STG Date to Achieve  --  06/24/19  -AB    STG 1  --  Pt will report compliance with HEP 3/3 times.  -AB    STG 1 Progress  --  Progressing  -AB    STG 2  --  Pt will complete 9 hole peg test to establish FMC baseline.  -AB    STG 2 Progress  --  Met  (Significant)   -AB    STG 3  --  Pt will complete  assessment to determine deficits with  strength  -AB    STG 3 Progress  --  Met  (Significant)   -AB    STG 4  --  Pt will complete pinch assessment to determine deficits with pinch strength.  -AB    STG 4 Progress  --  Met  (Significant)   -AB    STG 5  --  Pt will engage in 15 min BUE exercises with 3 rest breaks as needed 2/3 times to improve BUE strength and activity tolerance for ADLs and IADLs.  -AB    STG 5 Progress  --  Progressing  -AB       Long Term Goals    LTG Date to Achieve  --  07/24/19  -AB    LTG 1  --  Pt will improve LUE shoulder flexion and extension strength to 4+/5 during MMT 3 times to improve strength and independence for ADLs and IADLs.  -AB    LTG 1 Progress  --  Progressing  -AB    LTG 2  --  Pt will improve BUE elbow flexion and extension to 4+/5 during MMT 3 times to improve strength for ADLs and IADLs.  -AB    LTG 2 Progress  --  Progressing  -AB    LTG 3  --  Pt will engage in 15 min BUE exercises with 0-1 rest breaks to improve activity tolerance and BUE strength for ADLs and IADLs.  -AB    LTG 3 Progress  --  Progressing  -AB    LTG 4  --  Pt will engage in BUE FMC task while standing for 8 min 2/3 times to improve functional independence during ADLs and IADLs.  -AB    LTG 4 Progress  --  Progressing  -AB    LTG 5  --  Pt  will improve R hand 9 hole peg test to 22.5 seconds 2/3 times to improve FMC and coordination for ADLs and IADLs.  -AB    LTG 5 Progress  --  New  -AB    LTG 6  --  Pt will improve L hand 9 hole peg test to 25 seconds 2/3 times to improve FMC and coordination for ADLs and IADLs.  -AB    LTG 6 Progress  --  New  -AB    LTG 7  --  Pt will complete BUE bilateral coordination task with 90% accuracy to improve participation in ADLs and IADLs.  -AB    LTG 7 Progress  --  New  -AB    LTG 8  --  Improve L  strength by 10# average (43#) to improve successful engagement in ADLs and IADLs.  -AB    LTG 8 Progress  --  New  -AB       Time Calculation    OT Goal Re-Cert Due Date  06/24/19  -AB  06/24/19  -AB      User Key  (r) = Recorded By, (t) = Taken By, (c) = Cosigned By    Initials Name Provider Type    AB Shen Barrera OT Occupational Therapist          OT Exercises     Row Name 06/17/19 1100             Exercise 1    Exercise Name 1  3# [place]: pt completed rowboats, abduction exercises, and alternating rowboats to improve BUE strength and activity tolerance.  -AB      Sets 1  3  -AB      Reps 1  10  -AB      Intensity 1  Moderate  -AB         Exercise 2    Exercise Name 2  2# Dumbells: pt engaged in bicep curls to improve BUE strength and activity tolerance. Pt reported bilateral hands began shaking during last set of bicep curls. No more BUE therapeutic exercises were completed after pt c/o shaking hands.  -AB      Cueing 2  Demo  -AB      Sets 2  3  -AB      Intensity 2  Moderate  -AB         Exercise 3    Exercise Name 3  Object Manipulation skills activity: pt maneuvered dice from palm of hand to finger tips. Increased difficulty with R hand when compared to L hand.  -AB      Cueing 3  Demo  -AB      Intensity 3  Mild  -AB        User Key  (r) = Recorded By, (t) = Taken By, (c) = Cosigned By    Initials Name Provider Type    AB Shen Barrera OT Occupational Therapist          9 Hole Peg  9-Hole Peg  Left: 29.47 sec  9-Hole Peg Right: 31.87 sec(1 drop)           Time Calculation:   OT Start Time: 1102  OT Stop Time: 1147  OT Time Calculation (min): 45 min  Total Timed Code Minutes- OT: 45 minute(s)    Therapy Charges for Today     Code Description Service Date Service Provider Modifiers Qty    [id number]  OT THERAPEUTIC ACT EA 15 MIN 6/17/2019 Shen Barrera, OT GO 1    [id number]  OT THER PROC EA 15 MIN 6/17/2019 Shen Barrera OT GO 2                    Shen Barrera OT  6/17/2019

## 2019-06-17 NOTE — THERAPY TREATMENT NOTE
Outpatient Physical Therapy Ortho Treatment Note  [facility]     Patient Name: Laura West  : 1950  MRN: [id number]  Today's Date: 2019      Visit Date: 2019     Subjective Improvement 0  Visits 2/4  Visits approved 12 from 2019 to 2019  RTMD PRN  Recert Date 2019    Weakness    Visit Dx:    ICD-10-CM ICD-9-CM   1. Frequent falls R29.6 V15.88   2. Generalized weakness R53.1 780.79       Patient Active Problem List   Diagnosis   • Pseudophakia   • Diabetes mellitus without complication (CMS/HCC)        Past Medical History:   Diagnosis Date   • Acute anterior uveitis     OD, improved      • Acute bronchitis    • Acute gouty arthropathy     left 1st MTP joint    • Acute sinusitis    • Artificial lens present     IN POSITION - OU   • Benign neoplasm of skin of eyelid      s/p excision hidrocystoma RLL   • Cellulitis     rt distal arm mild      • Common cold    • Contusion    • Cortical senile cataract     OS   • Cough    • Dysuria    • Gout    • Gouty arthropathy    • Hand pain    • Hyperlipidemia    • Hypertension    • Knee pain    • Posterior subcapsular polar age-related cataract     OS   • Type 2 diabetes with complication (CMS/HCC)    • Vertigo    • Vitreous floaters     asteroid hyalosis OS    • Vitreous opacities     asteroid OS           Past Surgical History:   Procedure Laterality Date   • CATARACT EXTRACTION  2014    Remove cataract, insert lens (Left eye.)   • CATARACT EXTRACTION  2014    Remove cataract, insert lens (Right eye.)   • CATARACT EXTRACTION Bilateral    • EXCISION LESION  2014    EYELID EXCISION LESION 93558 (Benign neoplasm of skin of eyelid)    • INJECTION OF MEDICATION  2012    KENALOG   • OTHER SURGICAL HISTORY  2014    OPTICAL BIOMETRY 47484 (1)          PT Ortho     Row Name 19 1000       Precautions and Contraindications    Precautions/Limitations  fall precautions  -CP    Contraindications  pacemaker   -CP       Subjective Pain    Able to rate subjective pain?  yes  -CP    Pre-Treatment Pain Level  3  -CP    Subjective Pain Comment  neck and back  -CP       Posture/Observations    Posture/Observations Comments  Amb with SC  -CP      User Key  (r) = Recorded By, (t) = Taken By, (c) = Cosigned By    Initials Name Provider Type    CP Piedad Howe PTA Physical Therapy Assistant           Hand Therapy (last 24 hours)      Hand Eval     Row Name 06/17/19 1100             Subjective Pain    Able to rate subjective pain?  yes  -AB      Pre-Treatment Pain Level  3  -AB      Subjective Pain Comment  neck and back  -AB         Hand  Strength     Strength Affected Side  Bilateral  -AB          Strength Right    # Reps  3  -AB      Right Rung  2  -AB      Right  Test 1  55  -AB      Right  Test 2  40  -AB      Right  Test 3  42  -AB       Strength Average Right  45.67  -AB          Strength Left    # Reps  3  -AB      Left Rung  2  -AB      Left  Test 1  33  -AB      Left  Test 2  34  -AB      Left  Test 3  33  -AB       Strength Average Left  33.33  -AB         Pinch Strength    Affected Side  Left  -AB         Right Hand Strength - Pinch (lbs)    Lateral  14 lbs  -AB      Tip (2 point)  11 lbs  -AB      Tip (3 point)  9 lbs  -AB         Left Hand Strength - Pinch (lbs)    Lateral  11 lbs  -AB      Tip (2 point)  9 lbs  -AB      Tip (3 point)  10 lbs  -AB        User Key  (r) = Recorded By, (t) = Taken By, (c) = Cosigned By    Initials Name Provider Type    AB Shen Barrera OT Occupational Therapist                    PT Assessment/Plan     Row Name 06/17/19 1200 06/17/19 1104       PT Assessment    Assessment Comments  --  Patient is deconditioned with doing require a couple rest breaks between ther ex.  No LOB. Patient was 15 minutes late  -CP       PT Plan    PT Frequency  --  2x/week  -CP    Predicted Duration of Therapy Intervention (Therapy Eval)  6-8 weeks  "w/further TBD  -AB  6 weeks  -CP    PT Plan Comments  --  Cont with POC.  Supine hip AB/AD, bridging  -CP      User Key  (r) = Recorded By, (t) = Taken By, (c) = Cosigned By    Initials Name Provider Type    Piedad Mathews PTA Physical Therapy Assistant    Shen Murray, JON Occupational Therapist            Exercises     Row Name 06/17/19 1100 06/17/19 1000          Subjective Comments    Subjective Comments  --  Patient states that she is unsteady on her feet.  she is not sure why  -CP        Subjective Pain    Able to rate subjective pain?  yes  -AB  yes  -CP     Pre-Treatment Pain Level  3  -AB  3  -CP     Post-Treatment Pain Level  --  3  -CP     Subjective Pain Comment  neck and back  -AB  neck and back  -CP        Exercise 1    Exercise Name 1  --  Cane adjustment  -CP        Exercise 2    Exercise Name 2  --  Pro II level 1.5  -CP     Time 2  --  6  -CP        Exercise 3    Exercise Name 3  --  LAQ   -CP     Cueing 3  --  Verbal;Demo  -CP     Sets 3  --  1  -CP     Reps 3  --  15   -CP     Time 3  --  5\" hold  -CP     Additional Comments  --  bilateral  -CP        Exercise 4    Exercise Name 4  --  rest  -CP        Exercise 5    Exercise Name 5  --  hip AD squeezes  -CP     Cueing 5  --  Verbal;Demo  -CP     Sets 5  --  2  -CP     Reps 5  --  10  -CP     Time 5  --  5\" holds  -CP        Exercise 6    Exercise Name 6  --  seated marching  -CP     Cueing 6  --  Verbal;Demo  -CP     Sets 6  --  2  -CP     Reps 6  --  10  -CP     Time 6  --  bilateral  -CP        Exercise 7    Exercise Name 7  --  seated CR/TR  -CP     Cueing 7  --  Verbal;Demo  -CP     Sets 7  --  2  -CP     Reps 7  --  10  -CP        Exercise 8    Exercise Name 8  --  gait in clinic with SC  -CP     Time 8  --  270 ft  -CP       User Key  (r) = Recorded By, (t) = Taken By, (c) = Cosigned By    Initials Name Provider Type    Piedad Mathews PTA Physical Therapy Assistant    Shen Murray, JON Occupational Therapist    "                    PT OP Goals     Row Name 06/17/19 1100          PT Short Term Goals    STG Date to Achieve  06/24/19  -CP     STG 1  Patient will be independent home exercise program  -CP     STG 1 Progress  Ongoing  -CP     STG 2  Patient to tolerate 45 minutes exercise program  -CP     STG 2 Progress  Progressing  -CP     STG 3  Patient to have hip flexor MMT L 4+  -CP     STG 3 Progress  Progressing  -CP     STG 4  Patient have hip abductor MMT Jas Left 4, R 4+  -CP     STG 4 Progress  Progressing  -CP     STG 5  Patient have hip ext MMT 3/5  -CP     STG 5 Progress  Progressing  -CP        Long Term Goals    LTG 1  Patient have hamstring MMT 5/5 left  -CP     LTG 1 Progress  Progressing  -CP     LTG 2  Pt to not report any further falls  -CP     LTG 2 Progress  Not Met  -CP        Time Calculation    PT Goal Re-Cert Due Date  06/24/19  -CP       User Key  (r) = Recorded By, (t) = Taken By, (c) = Cosigned By    Initials Name Provider Type    CP Piedad Howe PTA Physical Therapy Assistant          Therapy Education  Education Details: seated marching, hip AD squeezes, LAQ  Given: HEP  Program: New  How Provided: Verbal, Demonstration, Written  Provided to: Patient  Level of Understanding: Teach back education performed, Verbalized, Demonstrated              Time Calculation:   Start Time: 1030  Stop Time: 1100  Time Calculation (min): 30 min  Total Timed Code Minutes- PT: 30 minute(s)  Therapy Charges for Today     Code Description Service Date Service Provider Modifiers Qty    28915595423 HC PT THER PROC EA 15 MIN 6/17/2019 Piedad Howe PTA GP 2                    Piedad Howe PTA  6/17/2019

## 2019-06-19 ENCOUNTER — HOSPITAL ENCOUNTER (OUTPATIENT)
Dept: OCCUPATIONAL THERAPY | Facility: HOSPITAL | Age: 69
Setting detail: THERAPIES SERIES
Discharge: HOME | End: 2019-06-19

## 2019-06-19 ENCOUNTER — HOSPITAL ENCOUNTER (OUTPATIENT)
Dept: PHYSICAL THERAPY | Facility: HOSPITAL | Age: 69
Setting detail: THERAPIES SERIES
Discharge: HOME | End: 2019-06-19

## 2019-06-19 DIAGNOSIS — R53.1 GENERALIZED WEAKNESS: Primary | ICD-10-CM

## 2019-06-19 DIAGNOSIS — R53.1 GENERALIZED WEAKNESS: ICD-10-CM

## 2019-06-19 DIAGNOSIS — R29.6 FREQUENT FALLS: Primary | ICD-10-CM

## 2019-06-19 DIAGNOSIS — Z74.09 IMPAIRED MOBILITY AND ADLS: ICD-10-CM

## 2019-06-19 DIAGNOSIS — Z78.9 IMPAIRED MOBILITY AND ADLS: ICD-10-CM

## 2019-06-19 PROCEDURE — 97110 THERAPEUTIC EXERCISES: CPT

## 2019-06-19 PROCEDURE — 97530 THERAPEUTIC ACTIVITIES: CPT

## 2019-06-19 NOTE — THERAPY TREATMENT NOTE
Outpatient Physical Therapy Ortho Treatment Note  [facility]     Patient Name: Laura West  : 1950  MRN: [id number]  Today's Date: 2019      Visit Date: 2019     Subjective Improvement 0  Visits 3/4  Visits approved 12 from 2019 to 2019  RTMD PRN  Recert Date 2019    Weakness    Visit Dx:    ICD-10-CM ICD-9-CM   1. Frequent falls R29.6 V15.88   2. Generalized weakness R53.1 780.79       Patient Active Problem List   Diagnosis   • Pseudophakia   • Diabetes mellitus without complication (CMS/HCC)        Past Medical History:   Diagnosis Date   • Acute anterior uveitis     OD, improved      • Acute bronchitis    • Acute gouty arthropathy     left 1st MTP joint    • Acute sinusitis    • Artificial lens present     IN POSITION - OU   • Benign neoplasm of skin of eyelid      s/p excision hidrocystoma RLL   • Cellulitis     rt distal arm mild      • Common cold    • Contusion    • Cortical senile cataract     OS   • Cough    • Dysuria    • Gout    • Gouty arthropathy    • Hand pain    • Hyperlipidemia    • Hypertension    • Knee pain    • Posterior subcapsular polar age-related cataract     OS   • Type 2 diabetes with complication (CMS/HCC)    • Vertigo    • Vitreous floaters     asteroid hyalosis OS    • Vitreous opacities     asteroid OS           Past Surgical History:   Procedure Laterality Date   • CATARACT EXTRACTION  2014    Remove cataract, insert lens (Left eye.)   • CATARACT EXTRACTION  2014    Remove cataract, insert lens (Right eye.)   • CATARACT EXTRACTION Bilateral    • EXCISION LESION  2014    EYELID EXCISION LESION 58530 (Benign neoplasm of skin of eyelid)    • INJECTION OF MEDICATION  2012    KENALOG   • OTHER SURGICAL HISTORY  2014    OPTICAL BIOMETRY 76787 (1)          PT Ortho     Row Name 19 1000       Precautions and Contraindications    Precautions/Limitations  fall precautions  -CP    Contraindications  pacemaker   "-CP       Posture/Observations    Posture/Observations Comments  Amb with SC  -CP    Row Name 06/17/19 1000       Precautions and Contraindications    Precautions/Limitations  fall precautions  -CP    Contraindications  pacemaker  -CP       Subjective Pain    Able to rate subjective pain?  yes  -CP    Pre-Treatment Pain Level  3  -CP    Subjective Pain Comment  neck and back  -CP       Posture/Observations    Posture/Observations Comments  Amb with SC  -CP      User Key  (r) = Recorded By, (t) = Taken By, (c) = Cosigned By    Initials Name Provider Type    Piedad Mathews PTA Physical Therapy Assistant                      PT Assessment/Plan     Row Name 06/19/19 1142          PT Assessment    Assessment Comments  Patient had no lob this date.  she is deconitiioned and does perform ther ex slowly with rest breaks when needed.  -CP        PT Plan    PT Frequency  2x/week  -CP     Predicted Duration of Therapy Intervention (Therapy Eval)  6 weeks  -CP     PT Plan Comments  Cont with POC.  Pro II next.  Recheck scheduled for next week  -CP       User Key  (r) = Recorded By, (t) = Taken By, (c) = Cosigned By    Initials Name Provider Type    Piedad Mahtews PTA Physical Therapy Assistant            Exercises     Row Name 06/19/19 1000             Subjective Comments    Subjective Comments  She is walking better since the can adjustment.  Reports no falls  -CP         Subjective Pain    Able to rate subjective pain?  yes  -CP      Pre-Treatment Pain Level  2  -CP      Post-Treatment Pain Level  2  -CP      Subjective Pain Comment  neck and neelima  -CP         Exercise 1    Exercise Name 1  gait from front of clinic to back  -CP         Exercise 2    Exercise Name 2  LAQ  -CP      Cueing 2  Verbal  -CP      Sets 2  2  -CP      Reps 2  10  -CP      Time 2  5\"  -CP      Additional Comments  bilateral  -CP         Exercise 3    Exercise Name 3  CR/TR standing  -CP      Cueing 3  Verbal;Demo  -CP      Sets 3  1  -CP  " "    Reps 3  10  -CP      Time 3  required rest  -CP         Exercise 4    Exercise Name 4  side stepping at taping table  -CP      Cueing 4  Verbal  -CP      Reps 4  3  -CP         Exercise 5    Exercise Name 5  SAQ  -CP      Cueing 5  Verbal;Tactile  -CP      Sets 5  2  -CP      Reps 5  10  -CP      Time 5  5\" holds  -CP      Additional Comments  bilateral  -CP         Exercise 6    Exercise Name 6  rest  -CP         Exercise 7    Exercise Name 7  supine heelslides  -CP      Cueing 7  Verbal  -CP      Sets 7  2  -CP      Reps 7  10  -CP      Time 7  bilateral  -CP         Exercise 8    Exercise Name 8  supine hip AB/Ad  -CP      Cueing 8  Verbal  -CP      Sets 8  2  -CP      Reps 8  10  -CP      Time 8  bilateral  -CP         Exercise 9    Exercise Name 9  QS  -CP      Cueing 9  Verbal;Tactile  -CP      Sets 9  2  -CP      Reps 9  10  -CP      Time 9  5\"  -CP      Additional Comments  bilateral  -CP        User Key  (r) = Recorded By, (t) = Taken By, (c) = Cosigned By    Initials Name Provider Type    Piedad Mathews, PTA Physical Therapy Assistant                       PT OP Goals     Row Name 06/19/19 1100          PT Short Term Goals    STG Date to Achieve  06/24/19  -CP     STG 1  Patient will be independent home exercise program  -CP     STG 1 Progress  Ongoing  -CP     STG 2  Patient to tolerate 45 minutes exercise program  -CP     STG 2 Progress  Progressing  -CP     STG 3  Patient to have hip flexor MMT L 4+  -CP     STG 3 Progress  Progressing  -CP     STG 4  Patient have hip abductor MMT Jas Left 4, R 4+  -CP     STG 4 Progress  Progressing  -CP     STG 5  Patient have hip ext MMT 3/5  -CP     STG 5 Progress  Progressing  -CP        Long Term Goals    LTG 1  Patient have hamstring MMT 5/5 left  -CP     LTG 1 Progress  Progressing  -CP     LTG 2  Pt to not report any further falls  -CP     LTG 2 Progress  Not Met  -CP        Time Calculation    PT Goal Re-Cert Due Date  06/24/19  -CP       User Key  " (r) = Recorded By, (t) = Taken By, (c) = Cosigned By    Initials Name Provider Type    CP Piedad Howe, FREDDY Physical Therapy Assistant          Therapy Education  Education Details: SAQ, supine heelslide, supine hip AB/AD, QS  Given: HEP  Program: New  How Provided: Verbal, Demonstration, Written  Provided to: Patient  Level of Understanding: Teach back education performed, Verbalized, Demonstrated              Time Calculation:   Start Time: 1020  Stop Time: 1100  Time Calculation (min): 40 min  Total Timed Code Minutes- PT: 40 minute(s)  Therapy Charges for Today     Code Description Service Date Service Provider Modifiers Qty    15749405111 HC PT THER PROC EA 15 MIN 6/19/2019 Piedad Howe, FREDDY GP 3                    Piedad Howe PTA  6/19/2019

## 2019-06-19 NOTE — THERAPY TREATMENT NOTE
"Outpatient Occupational Therapy Rehab Program Treatment  [facility]     Patient Name: Laura West  : 1950  MRN: [id number]  Today's Date: 2019        Visit Date: 2019  Visit Number: 3/3  Recert Date: 2019  % Improvement: TBD  MD visit date: TBD      Total Insurance visits approved: [employer]; 16 Visits    Patient Active Problem List   Diagnosis   • Pseudophakia   • Diabetes mellitus without complication (CMS/HCC)        Past Medical History:   Diagnosis Date   • Acute anterior uveitis     OD, improved      • Acute bronchitis    • Acute gouty arthropathy     left 1st MTP joint    • Acute sinusitis    • Artificial lens present     IN POSITION - OU   • Benign neoplasm of skin of eyelid      s/p excision hidrocystoma RLL   • Cellulitis     rt distal arm mild      • Common cold    • Contusion    • Cortical senile cataract     OS   • Cough    • Dysuria    • Gout    • Gouty arthropathy    • Hand pain    • Hyperlipidemia    • Hypertension    • Knee pain    • Posterior subcapsular polar age-related cataract     OS   • Type 2 diabetes with complication (CMS/HCC)    • Vertigo    • Vitreous floaters     asteroid hyalosis OS    • Vitreous opacities     asteroid OS           Past Surgical History:   Procedure Laterality Date   • CATARACT EXTRACTION  2014    Remove cataract, insert lens (Left eye.)   • CATARACT EXTRACTION  2014    Remove cataract, insert lens (Right eye.)   • CATARACT EXTRACTION Bilateral    • EXCISION LESION  2014    EYELID EXCISION LESION 42672 (Benign neoplasm of skin of eyelid)    • INJECTION OF MEDICATION  2012    KENALOG   • OTHER SURGICAL HISTORY  2014    OPTICAL BIOMETRY 20009 (1)            Visit Dx:    ICD-10-CM ICD-9-CM   1. Generalized weakness R53.1 780.79   2. Impaired mobility and ADLs Z74.09 799.89         OT Neuro     Row Name 19 1100             Subjective Comments    Subjective Comments  Pt stated \"I could tell I used my " "arms\" after her last tx session.  -AB         Precautions and Contraindications    Precautions/Limitations  fall precautions  -AB      Contraindications  No lifting more than 5#  -AB         Subjective Pain    Able to rate subjective pain?  yes  -AB      Pre-Treatment Pain Level  3  -AB      Post-Treatment Pain Level  2  -AB      Subjective Pain Comment  Neck  -AB         Cognitive Assessment/Intervention    Current Cognitive/Communication Assessment  functional  -AB      Orientation Status (Cognition)  oriented x 4  -AB         Sensation    Sensation WNL?  WFL  -AB         Functional Mobility    Functional Mobility- Device  standard walker  -AB      Functional Mobility- Comment  Pt displayed loss of balance when standing from seated position at end of treatment. She regained her balance and ambulated with cane and OTR with SBA for safety. Pt reported her LE was \"popping\" and she rested in lobby ~2 min and ambulated to car with cane and OTR for safety  -AB        User Key  (r) = Recorded By, (t) = Taken By, (c) = Cosigned By    Initials Name Provider Type    Shen Murray, JON Occupational Therapist                  Therapy Education  Education Details: HEP: pt was provided with pink medium soft theraputty and written instructions on activities to do with putty daily. She was also provided with BUE exercises and FMC activities to complete at home.  Given: HEP  Program: New  How Provided: Verbal, Demonstration, Written  Provided to: Patient  Level of Understanding: Teach back education performed, Verbalized, Demonstrated    OT Assessment/Plan     Row Name 06/19/19 1200 06/19/19 9100       OT Assessment    Assessment Comments  OT tx session tolerated well. Pt demonstrates good tolerance for hand strengthening activity this date. She continues to struggle with BUE exercise tolerance and presents with BUE weakness, bilateral coordination, B  strength, and general activity tolerance.  Pt experienced LOB when " "coming from a sitting to standing position at the end of tx session. She regained her balance and began ambulating with cane to lobby. OTR provided standby assistance for safety. Pt reported her LE was \"popping\" when ambulating and pt took approx. 2 min rest break in lobby. Pt then ambulated to car with OTR provided standby assistance for safety. Continue skilled OT services to address pt's deficits.  -AB  --       OT Plan    OT Frequency  2x/week  -AB  --    Predicted Duration of Therapy Intervention (Therapy Eval)  6-8 weeks with further TBD  -AB  6 weeks  -CP    OT Plan Comments  Pt would benefit from skilled OT services through BUE therapeutic exercises to improve BUE strength and activity tolerance for ADLs and IADLs. She would also benefit from therapeutic activities to improve FMC and bilateral coordination to increase independence and participation in ADLs and IADLs. Introduce FMC activities in upcoming tx session.  -AB  --      User Key  (r) = Recorded By, (t) = Taken By, (c) = Cosigned By    Initials Name Provider Type    Piedad Mathews, PTA Physical Therapy Assistant    AB Shen Barrera, OT Occupational Therapist           OT Goals     Row Name 06/19/19 1237 06/19/19 1105       OT Short Term Goals    STG Date to Achieve  --  06/24/19  -AB    STG 1  --  Pt will report compliance with HEP 3/3 times.  -AB    STG 1 Progress  --  Progressing  -AB    STG 2  --  Pt will complete FMC activity with 75% accuracy and 0-1 drops 2/3 times to improve fine motor control for ADLs and IADLs.  -AB    STG 2 Progress  --  New  -AB    STG 3  --  Improve L  strength by 5# average (38#) 2/3 times to improve functional independence in ADLs and IADLs.  -AB    STG 3 Progress  --  New  -AB    STG 4  --  Pt will complete pinch assessment to determine deficits with pinch strength.  -AB    STG 4 Progress  --  Met  -AB    STG 5  --  Pt will engage in 15 min BUE exercises with 3 rest breaks as needed 2/3 times to improve " BUE strength and activity tolerance for ADLs and IADLs.  -AB    STG 5 Progress  --  Progressing  -AB       Long Term Goals    LTG Date to Achieve  --  07/24/19  -AB    LTG 1  --  Pt will improve LUE shoulder flexion and extension strength to 4+/5 during MMT 3 times to improve strength and independence for ADLs and IADLs.  -AB    LTG 1 Progress  --  Progressing  -AB    LTG 2  --  Pt will improve BUE elbow flexion and extension to 4+/5 during MMT 3 times to improve strength for ADLs and IADLs.  -AB    LTG 2 Progress  --  Progressing  -AB    LTG 3  --  Pt will engage in 15 min BUE exercises with 0-1 rest breaks to improve activity tolerance and BUE strength for ADLs and IADLs.  -AB    LTG 3 Progress  --  Progressing  -AB    LTG 4  --  Pt will engage in BUE FMC task while standing for 8 min 2/3 times to improve functional independence during ADLs and IADLs.  -AB    LTG 4 Progress  --  Progressing  -AB    LTG 5  --  Pt will improve R hand 9 hole peg test to 22.5 seconds 2/3 times to improve FMC and coordination for ADLs and IADLs.  -AB    LTG 5 Progress  --  Progressing  -AB    LTG 6  --  Pt will improve L hand 9 hole peg test to 25 seconds 2/3 times to improve FMC and coordination for ADLs and IADLs.  -AB    LTG 6 Progress  --  Progressing  -AB    LTG 7  --  Pt will complete BUE bilateral coordination task with 90% accuracy to improve participation in ADLs and IADLs.  -AB    LT 7 Progress  --  Progressing  -AB    LTG 8  --  Improve L  strength by 10# average (43#) to improve successful engagement in ADLs and IADLs.  -AB    LTG 8 Progress  --  Progressing  -AB       Time Calculation    OT Goal Re-Cert Due Date  06/24/19  -AB  06/24/19  -AB      User Key  (r) = Recorded By, (t) = Taken By, (c) = Cosigned By    Initials Name Provider Type    AB Shen Barrera OT Occupational Therapist          OT Exercises     Row Name 06/19/19 1100             Exercise 4    Exercise Name 4  Pink theraputty: rolled ball, flattened  pancake, rolled and pinched snake, pushed in 4 beans, rolled ball, pulled apart putty and found beans. Repeated 2x to complete with each hand. Theraputty activity improves B hand strength and dexterity.  -AB      Intensity 4  Moderate  -AB         Exercise 5    Exercise Name 5  2 kg ball exs: pt completed diagonal exs to improve coordination and BUE strength. Pt reports exs towards R side were easier to complete.  -AB      Sets 5  1  -AB      Reps 5  10  -AB      Intensity 5  Moderate  -AB         Exercise 6    Exercise Name 6  3# Red Digi-Flex: 1 set of 10  strengthening per hand.  -AB      Sets 6  1  -AB      Reps 6  10  -AB      Intensity 6  Mild  -AB        User Key  (r) = Recorded By, (t) = Taken By, (c) = Cosigned By    Initials Name Provider Type    AB Shen Barrera OT Occupational Therapist                      Time Calculation:   OT Start Time: 1105  OT Stop Time: 1207  OT Time Calculation (min): 62 min  Total Timed Code Minutes- OT: 62 minute(s)    Therapy Charges for Today     Code Description Service Date Service Provider Modifiers Qty    83615644582  OT THERAPEUTIC ACT EA 15 MIN 6/19/2019 Shen Barrera OT GO 3    [id number]  OT THER PROC EA 15 MIN 6/19/2019 Shen Barrera OT GO 1                    Shen Barrera OT  6/19/2019

## 2019-06-24 ENCOUNTER — HOSPITAL ENCOUNTER (OUTPATIENT)
Dept: OCCUPATIONAL THERAPY | Facility: HOSPITAL | Age: 69
Setting detail: THERAPIES SERIES
Discharge: HOME | End: 2019-06-24

## 2019-06-24 ENCOUNTER — HOSPITAL ENCOUNTER (OUTPATIENT)
Dept: PHYSICAL THERAPY | Facility: HOSPITAL | Age: 69
Setting detail: THERAPIES SERIES
Discharge: HOME | End: 2019-06-24

## 2019-06-24 DIAGNOSIS — Z78.9 IMPAIRED MOBILITY AND ADLS: ICD-10-CM

## 2019-06-24 DIAGNOSIS — R53.1 GENERALIZED WEAKNESS: Primary | ICD-10-CM

## 2019-06-24 DIAGNOSIS — R29.6 FREQUENT FALLS: Primary | ICD-10-CM

## 2019-06-24 DIAGNOSIS — R53.1 GENERALIZED WEAKNESS: ICD-10-CM

## 2019-06-24 DIAGNOSIS — Z74.09 IMPAIRED MOBILITY AND ADLS: ICD-10-CM

## 2019-06-24 PROCEDURE — 97530 THERAPEUTIC ACTIVITIES: CPT

## 2019-06-24 PROCEDURE — 97110 THERAPEUTIC EXERCISES: CPT | Performed by: PHYSICAL THERAPIST

## 2019-06-24 PROCEDURE — 97110 THERAPEUTIC EXERCISES: CPT

## 2019-06-24 NOTE — THERAPY TREATMENT NOTE
Outpatient Occupational Therapy Rehab Program Treatment  [facility]     Patient Name: Laura West  : 1950  MRN: [id number]  Today's Date: 2019        Visit Date: 2019  Visit Number: 44   Recert Date: 19  % Improvement: TBD  MD visit date: TBD      Total Insurance visits approved: [employer]; 16 Visits by 19    Patient Active Problem List   Diagnosis   • Pseudophakia   • Diabetes mellitus without complication (CMS/HCC)        Past Medical History:   Diagnosis Date   • Acute anterior uveitis     OD, improved      • Acute bronchitis    • Acute gouty arthropathy     left 1st MTP joint    • Acute sinusitis    • Artificial lens present     IN POSITION - OU   • Benign neoplasm of skin of eyelid      s/p excision hidrocystoma RLL   • Cellulitis     rt distal arm mild      • Common cold    • Contusion    • Cortical senile cataract     OS   • Cough    • Dysuria    • Gout    • Gouty arthropathy    • Hand pain    • Hyperlipidemia    • Hypertension    • Knee pain    • Posterior subcapsular polar age-related cataract     OS   • Type 2 diabetes with complication (CMS/HCC)    • Vertigo    • Vitreous floaters     asteroid hyalosis OS    • Vitreous opacities     asteroid OS           Past Surgical History:   Procedure Laterality Date   • CATARACT EXTRACTION  2014    Remove cataract, insert lens (Left eye.)   • CATARACT EXTRACTION  2014    Remove cataract, insert lens (Right eye.)   • CATARACT EXTRACTION Bilateral    • EXCISION LESION  2014    EYELID EXCISION LESION 60190 (Benign neoplasm of skin of eyelid)    • INJECTION OF MEDICATION  2012    KENALOG   • OTHER SURGICAL HISTORY  2014    OPTICAL BIOMETRY 51985 (1)            Visit Dx:    ICD-10-CM ICD-9-CM   1. Generalized weakness R53.1 780.79   2. Impaired mobility and ADLs Z74.09 799.89         OT Neuro     Row Name 19 1300             Subjective Comments    Subjective Comments  Pt stated she thought her  appointment time was at 1315. Pt arrived ~20 min late to tx session this date d/t misunderstanding appointment time.  -AB         Precautions and Contraindications    Precautions/Limitations  fall precautions  -AB      Contraindications  No lifting more than 5#  -AB         Subjective Pain    Subjective Pain Comment  Pt reported minor pain in RUE after last tx session. Pt states she believed pain was d/t ball exercises.  -AB         Cognitive Assessment/Intervention    Current Cognitive/Communication Assessment  functional  -AB      Orientation Status (Cognition)  oriented x 4  -AB        User Key  (r) = Recorded By, (t) = Taken By, (c) = Cosigned By    Initials Name Provider Type    Shen Murray, OT Occupational Therapist                  Therapy Education  Education Details: HEP: theraband exercises added  Given: HEP  Program: New, Progressed  How Provided: Verbal, Demonstration  Provided to: Patient  Level of Understanding: Verbalized, Demonstrated, Teach back education performed    OT Assessment/Plan     Row Name 06/24/19 1320          OT Assessment    Assessment Comments  OT tx session tolerated well. Pt demonstrated improvement with bilateral coordination and FMC during BUE stringing activity. She continues to present with fair form and fair to poor tolerance during BUE therapeutic exercises. She demonstrates impaired BUE strength, activity tolerance, and bilateral coordination. Continue skilled OT services to address these deficits.   -AB        OT Plan    OT Frequency  2x/week  -AB     Predicted Duration of Therapy Intervention (Therapy Eval)  6-8 weeks w/further TBD  -AB     OT Plan Comments  Progress with skilled OT services through BUE therapeutic exercises to improve BUE strength and activity tolerance for ADLs and IADLs. She would also benefit from therapeutic activities to improve FMC and bilateral coordination to increase independence and participation in ADLs and IADLs.  -AB       User Key   (r) = Recorded By, (t) = Taken By, (c) = Cosigned By    Initials Name Provider Type    Shen Murray, OT Occupational Therapist           OT Goals     Row Name 06/24/19 1513 06/24/19 1320       OT Short Term Goals    STG Date to Achieve  --  06/24/19  -AB    STG 1  --  Pt will report compliance with HEP 3/3 times.  -AB    STG 1 Progress  --  Progressing  -AB    STG 2  --  Pt will complete FMC activity with 75% accuracy and 0-1 drops 2/3 times to improve fine motor control for ADLs and IADLs.  -AB    STG 2 Progress  --  Partially Met;Progressing  -AB    STG 2 Progress Comments  --  Met 1/1 times.  -AB    STG 3  --  Improve L  strength by 5# average (38#) 2/3 times to improve functional independence in ADLs and IADLs.  -AB    STG 3 Progress  --  Progressing  -AB    STG 4  --  Pt will complete pinch assessment to determine deficits with pinch strength.  -AB    STG 4 Progress  --  Met  -AB    STG 5  --  Pt will engage in 15 min BUE exercises with 3 rest breaks as needed 2/3 times to improve BUE strength and activity tolerance for ADLs and IADLs.  -AB    STG 5 Progress  --  Progressing  -AB       Long Term Goals    LTG Date to Achieve  --  07/24/19  -AB    LTG 1  --  Pt will improve LUE shoulder flexion and extension strength to 4+/5 during MMT 3 times to improve strength and independence for ADLs and IADLs.  -AB    LTG 1 Progress  --  Progressing  -AB    LTG 2  --  Pt will improve BUE elbow flexion and extension to 4+/5 during MMT 3 times to improve strength for ADLs and IADLs.  -AB    LTG 2 Progress  --  Progressing  -AB    LTG 3  --  Pt will engage in 15 min BUE exercises with 0-1 rest breaks to improve activity tolerance and BUE strength for ADLs and IADLs.  -AB    LTG 3 Progress  --  Progressing  -AB    LTG 4  --  Pt will engage in BUE FMC task while standing for 8 min 2/3 times to improve functional independence during ADLs and IADLs.  -AB    LTG 4 Progress  --  Progressing  -AB    LTG 5  --  Pt will  improve R hand 9 hole peg test to 22.5 seconds 2/3 times to improve FMC and coordination for ADLs and IADLs.  -AB    LTG 5 Progress  --  Progressing  -AB    LTG 6  --  Pt will improve L hand 9 hole peg test to 25 seconds 2/3 times to improve FMC and coordination for ADLs and IADLs.  -AB    LTG 6 Progress  --  Progressing  -AB    LTG 7  --  Pt will complete BUE bilateral coordination task with 90% accuracy to improve participation in ADLs and IADLs.  -AB    LTG 7 Progress  --  Progressing  -AB    LTG 8  --  Improve L  strength by 10# average (43#) to improve successful engagement in ADLs and IADLs.  -AB    LTG 8 Progress  --  Progressing  -AB       Time Calculation    OT Goal Re-Cert Due Date  06/24/19  -AB  06/24/19  -AB      User Key  (r) = Recorded By, (t) = Taken By, (c) = Cosigned By    Initials Name Provider Type    AB Shen Barrera OT Occupational Therapist          OT Exercises     Row Name 06/24/19 1300             Functional Mobility    Functional Mobility- Device  straight cane  -AB      Functional Mobility- Comment  Pt ambulated from lobby to OT room using cane.  -AB         Exercise 7    Exercise Name 7  Stringing beads: pt strung 15 beads with B hands.  -AB      Intensity 7  Mild  -AB         Exercise 8    Exercise Name 8  Blue BUE Theraband Exercises: pt completed bilateral pulling exercises, bicep curls, shoulder flexion, and shoulder extension exercises. 3 sets of 15 for BUE pulls and bicep curls. 1 set of 15 reps with shoulder flexion and extension exercises d/t limited time this date.   -AB      Equipment 8  Theraband  -AB      Sets 8  3 1,2  -AB      Reps 8  15  -AB      Intensity 8  Intense  -AB        User Key  (r) = Recorded By, (t) = Taken By, (c) = Cosigned By    Initials Name Provider Type    AB Shen Barrera OT Occupational Therapist                      Time Calculation:   OT Start Time: 1320  OT Stop Time: 1345  OT Time Calculation (min): 25 min    Therapy Charges for Today      Code Description Service Date Service Provider Modifiers Qty    55448126042  OT THERAPEUTIC ACT EA 15 MIN 6/24/2019 Shen Barrera, OT GO 1    [id number]  OT THER PROC EA 15 MIN 6/24/2019 Shen Barrera OT GO 1                    Shen Barrera OT  6/24/2019

## 2019-06-24 NOTE — THERAPY PROGRESS REPORT/RE-CERT
Outpatient Physical Therapy Ortho Progress Note  [facility]     Patient Name: Laura West  : 1950  MRN: [id number]  Today's Date: 2019      Visit Date: 2019  Subjective Improvement 20%  Visits 4/4  Visits approved 12 from 2019 to 2019  RTMD PRN  Recert Date 7/      Visit Dx:    ICD-10-CM ICD-9-CM   1. Frequent falls R29.6 V15.88   2. Generalized weakness R53.1 780.79       Patient Active Problem List   Diagnosis   • Pseudophakia   • Diabetes mellitus without complication (CMS/HCC)        Past Medical History:   Diagnosis Date   • Acute anterior uveitis     OD, improved      • Acute bronchitis    • Acute gouty arthropathy     left 1st MTP joint    • Acute sinusitis    • Artificial lens present     IN POSITION - OU   • Benign neoplasm of skin of eyelid      s/p excision hidrocystoma RLL   • Cellulitis     rt distal arm mild      • Common cold    • Contusion    • Cortical senile cataract     OS   • Cough    • Dysuria    • Gout    • Gouty arthropathy    • Hand pain    • Hyperlipidemia    • Hypertension    • Knee pain    • Posterior subcapsular polar age-related cataract     OS   • Type 2 diabetes with complication (CMS/HCC)    • Vertigo    • Vitreous floaters     asteroid hyalosis OS    • Vitreous opacities     asteroid OS           Past Surgical History:   Procedure Laterality Date   • CATARACT EXTRACTION  2014    Remove cataract, insert lens (Left eye.)   • CATARACT EXTRACTION  2014    Remove cataract, insert lens (Right eye.)   • CATARACT EXTRACTION Bilateral    • EXCISION LESION  2014    EYELID EXCISION LESION 21683 (Benign neoplasm of skin of eyelid)    • INJECTION OF MEDICATION  2012    KENALOG   • OTHER SURGICAL HISTORY  2014    OPTICAL BIOMETRY 30426 (1)          PT Ortho     Row Name 19 1400       Precautions and Contraindications    Precautions/Limitations  fall precautions  -DD       Posture/Observations     Posture/Observations Comments  Amb with SC  -DD      User Key  (r) = Recorded By, (t) = Taken By, (c) = Cosigned By    Initials Name Provider Type    Daniela Aragon, PT DPT Physical Therapist         Objective  Presents with wrist forearm splint    States she has a st cane, needs cues to sit to stand correctly and safely  Obese, deconditioned female. Tennis shoes this visit  MMT Right 5/5 except hip abd 4, ext 3-  Left Hip flexion 4, hamstring 4+, hip abd 4-, ext 3-  Slow and guarded gait, Decreased balance without UE suuport with exercises.             PT Assessment/Plan     Row Name 06/24/19 1513 06/24/19 1320       PT Assessment    Functional Limitations  Impaired gait;Decreased safety during functional activities;Performance in leisure activities;Limitation in home management  -DD  --    Impairments  Muscle strength;Gait;Endurance;Balance  -DD  --    Assessment Comments  Pt needs further hip strenthening to meet goals. doubtful she does HEP regularly.   Needs endurance.  -DD  --    Rehab Potential  Good  -DD  --    Patient/caregiver participated in establishment of treatment plan and goals  Yes  -DD  --    Patient would benefit from skilled therapy intervention  Yes  -DD  --       PT Plan    PT Frequency  2x/week  -DD  --    Predicted Duration of Therapy Intervention (Therapy Eval)  4 weeks  -DD  6-8 weeks w/further TBD  -AB    PT Plan Comments  Work on standing CKC exercises and core/hip strengthening.   -DD  --      User Key  (r) = Recorded By, (t) = Taken By, (c) = Cosigned By    Initials Name Provider Type    Daniela Aragon, PT DPT Physical Therapist    Shen Murray, OT Occupational Therapist            Exercises     Row Name 06/24/19 1400             Subjective Comments    Subjective Comments  No new falls this week.   -DD         Subjective Pain    Able to rate subjective pain?  yes  -DD      Pre-Treatment Pain Level  2  -DD      Post-Treatment Pain Level  2  -DD       "Subjective Pain Comment  neck and back  -DD         Exercise 1    Exercise Name 1  gait from front to Rx area  -DD      Additional Comments  cues for sit to stand with the cane  -DD         Exercise 2    Exercise Name 2  Pro 2  -DD      Time 2  8'  -DD         Exercise 3    Exercise Name 3  CR standing  -DD      Reps 3  20  -DD         Exercise 4    Exercise Name 4  step ups 6\"  -DD      Reps 4  10 jas  -DD         Exercise 5    Exercise Name 5  lateral stepping abd  -DD      Reps 5  5  -DD         Exercise 6    Exercise Name 6  LAQ  -DD      Reps 6  20  -DD         Exercise 7    Exercise Name 7  seated marches  -DD      Reps 7  20  -DD         Exercise 8    Exercise Name 8  clamshells   -DD      Reps 8  20 left  -DD         Exercise 9    Exercise Name 9  Prone TKE  -DD      Reps 9  20  -DD        User Key  (r) = Recorded By, (t) = Taken By, (c) = Cosigned By    Initials Name Provider Type    Daniela Aragon, PT DPT Physical Therapist                       PT OP Goals     Row Name 06/24/19 1516 06/24/19 1345       PT Short Term Goals    STG Date to Achieve  --  06/24/19  -DD    STG 1  --  Patient will be independent home exercise program  -DD    STG 1 Progress  --  Ongoing;Progressing  -DD    STG 2  --  Patient to tolerate 45 minutes exercise program  -DD    STG 2 Progress  --  Progressing  -DD    STG 3  --  Patient to have hip flexor MMT L 4+  -DD    STG 3 Progress  --  Progressing  -DD    STG 4  --  Patient have hip abductor MMT Jas Left 4, R 4+  -DD    STG 4 Progress  --  Progressing  -DD    STG 5  --  Patient have hip ext MMT 3/5  -DD    STG 5 Progress  --  Progressing  -DD       Long Term Goals    LTG 1  --  Patient have hamstring MMT 5/5 left  -DD    LTG 1 Progress  --  Progressing  -DD    LTG 2  --  Pt to not report any further falls  -DD    LTG 2 Progress  --  Not Met  -DD    LTG 2 Progress Comments  --  fell out of bed since starting PT.  -DD       Time Calculation    PT Goal Re-Cert Due Date  " 07/15/19  -DD  --      User Key  (r) = Recorded By, (t) = Taken By, (c) = Cosigned By    Initials Name Provider Type    Daniela Aragon, PT DPT Physical Therapist          Therapy Education  Education Details: PRone  TKE added and ZENA jacinto, seated march reviewed  Given: HEP  Program: New, Reinforced  How Provided: Verbal  Provided to: Patient  Level of Understanding: Verbalized, Demonstrated              Time Calculation:   Start Time: 1345  Stop Time: 1430  Time Calculation (min): 45 min  Total Timed Code Minutes- PT: 45 minute(s)  Therapy Charges for Today     Code Description Service Date Service Provider Modifiers Qty    44340908790 HC PT THER PROC EA 15 MIN 6/24/2019 Daniela Alba, PT DPT GP 3            Daniela Alba, PT DPT  6/24/2019

## 2019-06-26 ENCOUNTER — HOSPITAL ENCOUNTER (OUTPATIENT)
Dept: OCCUPATIONAL THERAPY | Facility: HOSPITAL | Age: 69
Setting detail: THERAPIES SERIES
Discharge: HOME | End: 2019-06-26

## 2019-06-26 ENCOUNTER — HOSPITAL ENCOUNTER (OUTPATIENT)
Dept: PHYSICAL THERAPY | Facility: HOSPITAL | Age: 69
Setting detail: THERAPIES SERIES
Discharge: HOME | End: 2019-06-26

## 2019-06-26 DIAGNOSIS — Z74.09 IMPAIRED MOBILITY AND ADLS: ICD-10-CM

## 2019-06-26 DIAGNOSIS — R53.1 GENERALIZED WEAKNESS: ICD-10-CM

## 2019-06-26 DIAGNOSIS — Z78.9 IMPAIRED MOBILITY AND ADLS: ICD-10-CM

## 2019-06-26 DIAGNOSIS — R53.1 GENERALIZED WEAKNESS: Primary | ICD-10-CM

## 2019-06-26 DIAGNOSIS — R29.6 FREQUENT FALLS: Primary | ICD-10-CM

## 2019-06-26 PROCEDURE — 97530 THERAPEUTIC ACTIVITIES: CPT

## 2019-06-26 PROCEDURE — 97110 THERAPEUTIC EXERCISES: CPT

## 2019-06-26 NOTE — THERAPY TREATMENT NOTE
Outpatient Physical Therapy Ortho Treatment Note  [facility]     Patient Name: Laura West  : 1950  MRN: [id number]  Today's Date: 2019      Visit Date: 2019     Subjective Improvement maybe a little  Visits 5/6  Visits approved 12 from 2019 to 2019  RTMD PRN  Recert Date 7-    Weakness, frequent falls    Visit Dx:    ICD-10-CM ICD-9-CM   1. Frequent falls R29.6 V15.88   2. Generalized weakness R53.1 780.79       Patient Active Problem List   Diagnosis   • Pseudophakia   • Diabetes mellitus without complication (CMS/HCC)        Past Medical History:   Diagnosis Date   • Acute anterior uveitis     OD, improved      • Acute bronchitis    • Acute gouty arthropathy     left 1st MTP joint    • Acute sinusitis    • Artificial lens present     IN POSITION - OU   • Benign neoplasm of skin of eyelid      s/p excision hidrocystoma RLL   • Cellulitis     rt distal arm mild      • Common cold    • Contusion    • Cortical senile cataract     OS   • Cough    • Dysuria    • Gout    • Gouty arthropathy    • Hand pain    • Hyperlipidemia    • Hypertension    • Knee pain    • Posterior subcapsular polar age-related cataract     OS   • Type 2 diabetes with complication (CMS/HCC)    • Vertigo    • Vitreous floaters     asteroid hyalosis OS    • Vitreous opacities     asteroid OS           Past Surgical History:   Procedure Laterality Date   • CATARACT EXTRACTION  2014    Remove cataract, insert lens (Left eye.)   • CATARACT EXTRACTION  2014    Remove cataract, insert lens (Right eye.)   • CATARACT EXTRACTION Bilateral    • EXCISION LESION  2014    EYELID EXCISION LESION 58342 (Benign neoplasm of skin of eyelid)    • INJECTION OF MEDICATION  2012    KENALOG   • OTHER SURGICAL HISTORY  2014    OPTICAL BIOMETRY 53524 (1)          PT Ortho     Row Name 19 1400       Precautions and Contraindications    Precautions/Limitations  fall precautions  -DD     "   Posture/Observations    Posture/Observations Comments  Amb with SC  -DD      User Key  (r) = Recorded By, (t) = Taken By, (c) = Cosigned By    Initials Name Provider Type    Daniela Aragon, PT DPT Physical Therapist                      PT Assessment/Plan     Row Name 06/26/19 1118          PT Assessment    Assessment Comments  Patient has slow gait with SC.  She did have 2 LOB with 6 minute walk test.  Patient was fatigued after therapy/  -CP        PT Plan    PT Frequency  2x/week  -CP     Predicted Duration of Therapy Intervention (Therapy Eval)  4 weeks  -CP     PT Plan Comments  Cont with POC.    -CP       User Key  (r) = Recorded By, (t) = Taken By, (c) = Cosigned By    Initials Name Provider Type    Piedad Mathews, PTA Physical Therapy Assistant            Exercises     Row Name 06/26/19 1000             Subjective Comments    Subjective Comments  Patient reports no falls.  She reports neck pain today  States that the light headedness has gone away.  -CP         Subjective Pain    Able to rate subjective pain?  yes  -CP      Pre-Treatment Pain Level  2  -CP      Post-Treatment Pain Level  2  -CP      Subjective Pain Comment  neck  -CP         Exercise 1    Exercise Name 1  6 minute walk text  -CP      Sets 1  with SC  -CP      Reps 1  540 ft  -CP      Time 1  patient had 2 LOB  -CP         Exercise 2    Exercise Name 2  Pro II level 2  -CP      Time 2  10  -CP         Exercise 3    Exercise Name 3  rest  -CP         Exercise 4    Exercise Name 4  LAQ  -CP      Cueing 4  Verbal;Demo  -CP      Sets 4  2  -CP      Reps 4  10  -CP      Time 4  5\" hold  -CP      Additional Comments  1 lb  -CP         Exercise 5    Exercise Name 5  side stepping at taping table  -CP      Cueing 5  Verbal;Demo  -CP      Reps 5  5  -CP      Additional Comments  1 lb AW  -CP         Exercise 6    Exercise Name 6  standing marching at taping table  -CP      Cueing 6  Verbal;Demo  -CP      Sets 6  2  -CP      Reps 6  " 10  -CP      Time 6  1 lb AW  -CP        User Key  (r) = Recorded By, (t) = Taken By, (c) = Cosigned By    Initials Name Provider Type    CP Piedad Howe, FREDDY Physical Therapy Assistant                       PT OP Goals     Row Name 06/26/19 1100          PT Short Term Goals    STG Date to Achieve  06/24/19  -CP     STG 1  Patient will be independent home exercise program  -CP     STG 1 Progress  Ongoing;Progressing  -CP     STG 2  Patient to tolerate 45 minutes exercise program  -CP     STG 2 Progress  Progressing  -CP     STG 3  Patient to have hip flexor MMT L 4+  -CP     STG 3 Progress  Progressing  -CP     STG 4  Patient have hip abductor MMT Jas Left 4, R 4+  -CP     STG 4 Progress  Progressing  -CP     STG 5  Patient have hip ext MMT 3/5  -CP     STG 5 Progress  Progressing  -CP        Long Term Goals    LTG 1  Patient have hamstring MMT 5/5 left  -CP     LTG 1 Progress  Progressing  -CP     LTG 2  Pt to not report any further falls  -CP     LTG 2 Progress  Not Met  -CP        Time Calculation    PT Goal Re-Cert Due Date  07/15/19  -CP       User Key  (r) = Recorded By, (t) = Taken By, (c) = Cosigned By    Initials Name Provider Type    CP Piedad Howe PTA Physical Therapy Assistant                         Time Calculation:   Start Time: 1020  Stop Time: 1100  Time Calculation (min): 40 min  Total Timed Code Minutes- PT: 40 minute(s)  Therapy Charges for Today     Code Description Service Date Service Provider Modifiers Qty    38192288558 HC PT THER PROC EA 15 MIN 6/26/2019 Piedad Howe PTA GP 3                    Piedad Howe PTA  6/26/2019

## 2019-06-26 NOTE — THERAPY TREATMENT NOTE
Outpatient Occupational Therapy Rehab Program Treatment  [facility]     Patient Name: Laura West  : 1950  MRN: [id number]  Today's Date: 2019        Visit Date: 2019  Visit Number: 5  Recert Date:   % Improvement: TBD  MD visit date: TBD      Total Insurance visits approved: [employer]; 16 V by 19    Patient Active Problem List   Diagnosis   • Pseudophakia   • Diabetes mellitus without complication (CMS/HCC)        Past Medical History:   Diagnosis Date   • Acute anterior uveitis     OD, improved      • Acute bronchitis    • Acute gouty arthropathy     left 1st MTP joint    • Acute sinusitis    • Artificial lens present     IN POSITION - OU   • Benign neoplasm of skin of eyelid      s/p excision hidrocystoma RLL   • Cellulitis     rt distal arm mild      • Common cold    • Contusion    • Cortical senile cataract     OS   • Cough    • Dysuria    • Gout    • Gouty arthropathy    • Hand pain    • Hyperlipidemia    • Hypertension    • Knee pain    • Posterior subcapsular polar age-related cataract     OS   • Type 2 diabetes with complication (CMS/HCC)    • Vertigo    • Vitreous floaters     asteroid hyalosis OS    • Vitreous opacities     asteroid OS           Past Surgical History:   Procedure Laterality Date   • CATARACT EXTRACTION  2014    Remove cataract, insert lens (Left eye.)   • CATARACT EXTRACTION  2014    Remove cataract, insert lens (Right eye.)   • CATARACT EXTRACTION Bilateral    • EXCISION LESION  2014    EYELID EXCISION LESION 43965 (Benign neoplasm of skin of eyelid)    • INJECTION OF MEDICATION  2012    KENALOG   • OTHER SURGICAL HISTORY  2014    OPTICAL BIOMETRY 22764 (1)            Visit Dx:    ICD-10-CM ICD-9-CM   1. Generalized weakness R53.1 780.79   2. Impaired mobility and ADLs Z74.09 799.89         OT Neuro     Row Name 19 1100             Subjective Comments    Subjective Comments  Pt expresses difficulty grasping  items when having to reach across body. She also reports using both hands to grasp items d/t hand weakness.  -AB         Precautions and Contraindications    Precautions/Limitations  fall precautions  -AB         Subjective Pain    Post-Treatment Pain Level  2  -AB        User Key  (r) = Recorded By, (t) = Taken By, (c) = Cosigned By    Initials Name Provider Type    Shen Murray OT Occupational Therapist                  Therapy Education  Education Details: HEP: hand strengthening exs added  Given: HEP  Program: Reinforced, Progressed  How Provided: Verbal, Demonstration  Provided to: Patient  Level of Understanding: Verbalized, Demonstrated    OT Assessment/Plan     Row Name 06/26/19 1400 06/26/19 1118       OT Assessment    Assessment Comments  OT tx session tolerated well. Pt demonstrates good tolerance for  and pinch strengthening activities, as well as FMC activities. Increasd time required for each task this date. Pt continues to present with impaired BUE strength, bilateral coordination, generalized weakness, and activity tolerance for ADLs and IADLs.  -AB  --       OT Plan    OT Frequency  2x/week  -AB  --    Predicted Duration of Therapy Intervention (Therapy Eval)  6-8 weeks w/further TBD  -AB  4 weeks  -CP    OT Plan Comments  Progress with skilled OT services through BUE therapeutic exercises to improve BUE strength and activity tolerance for ADLs and IADLs. She would also benefit from therapeutic activities to improve FMC and bilateral coordination to increase independence and participation in ADLs and IADLs.  -AB  --      User Key  (r) = Recorded By, (t) = Taken By, (c) = Cosigned By    Initials Name Provider Type    Piedad Mathews, PTA Physical Therapy Assistant    Shen Murray, OT Occupational Therapist           OT Goals     Row Name 06/26/19 1417 06/26/19 1100       OT Short Term Goals    STG Date to Achieve  --  06/24/19  -AB    STG 1  --  Pt will report compliance with  HEP 3/3 times.  -AB    STG 1 Progress  --  Progressing  -AB    STG 2  --  Pt will complete FMC activity with 75% accuracy and 0-1 drops 2/3 times to improve fine motor control for ADLs and IADLs.  -AB    STG 2 Progress  --  Partially Met;Progressing  -AB    STG 2 Progress Comments  --  Met 2/2 times; increased time required.  -AB    STG 3  --  Improve L  strength by 5# average (38#) 2/3 times to improve functional independence in ADLs and IADLs.  -AB    STG 3 Progress  --  Progressing  -AB    STG 4  --  Pt will complete pinch assessment to determine deficits with pinch strength.  -AB    STG 4 Progress  --  Met  -AB    STG 5  --  Pt will engage in 15 min BUE exercises with 3 rest breaks as needed 2/3 times to improve BUE strength and activity tolerance for ADLs and IADLs.  -AB    STG 5 Progress  --  Progressing  -AB       Long Term Goals    LTG Date to Achieve  --  07/24/19  -AB    LTG 1  --  Pt will improve LUE shoulder flexion and extension strength to 4+/5 during MMT 3 times to improve strength and independence for ADLs and IADLs.  -AB    LTG 1 Progress  --  Progressing  -AB    LTG 2  --  Pt will improve BUE elbow flexion and extension to 4+/5 during MMT 3 times to improve strength for ADLs and IADLs.  -AB    LTG 2 Progress  --  Progressing  -AB    LTG 3  --  Pt will engage in 15 min BUE exercises with 0-1 rest breaks to improve activity tolerance and BUE strength for ADLs and IADLs.  -AB    LTG 3 Progress  --  Progressing  -AB    LTG 4  --  Pt will engage in BUE FMC task while standing for 8 min 2/3 times to improve functional independence during ADLs and IADLs.  -AB    LTG 4 Progress  --  Progressing  -AB    LTG 5  --  Pt will improve R hand 9 hole peg test to 22.5 seconds 2/3 times to improve FMC and coordination for ADLs and IADLs.  -AB    LTG 5 Progress  --  Progressing  -AB    LTG 6  --  Pt will improve L hand 9 hole peg test to 25 seconds 2/3 times to improve FMC and coordination for ADLs and IADLs.   -AB    LTG 6 Progress  --  Progressing  -AB    LTG 7  --  Pt will complete BUE bilateral coordination task with 90% accuracy to improve participation in ADLs and IADLs.  -AB    LTG 7 Progress  --  Progressing  -AB    LTG 8  --  Improve L  strength by 10# average (43#) to improve successful engagement in ADLs and IADLs.  -AB    LTG 8 Progress  --  Progressing  -AB       Time Calculation    OT Goal Re-Cert Due Date  06/24/19  -AB  06/26/19  -AB      User Key  (r) = Recorded By, (t) = Taken By, (c) = Cosigned By    Initials Name Provider Type    AB Shen Barrera OT Occupational Therapist          OT Exercises     Row Name 06/26/19 1100             Subjective Pain    Able to rate subjective pain?  yes  -AB      Pre-Treatment Pain Level  2  -AB      Subjective Pain Comment  Neck and shoulders  -AB         Functional Mobility    Functional Mobility- Device  straight cane  -AB      Functional Mobility- Comment  Pt ambulated from lobby to OT room with cane.  -AB         Exercise 6    Exercise Name 6  3# Red Digi-Flex: pt completed  and pinch strengthening exercises with B hands for improved hand strength.  -AB      Sets 6  3  -AB      Reps 6  10  -AB      Intensity 6  Moderate  -AB         Exercise 9    Exercise Name 9  Stringing Activity: pt completed bilateral hand stringing task with increased time to improve FMC and bilateral coordination. Pt then pulled string out of the board to improve pinch and dexterity.  -AB      Cueing 9  Demo  -AB      Intensity 9  Moderate  -AB         Exercise 10    Exercise Name 10  Clothespin tree: pt used B hands to pinch clothespins and place on tree. Activity improves pinch strength, dexterity, FMC, and coordination. Increased time required for task.  -AB      Cueing 10  Demo  -AB      Intensity 10  Moderate  -AB        User Key  (r) = Recorded By, (t) = Taken By, (c) = Cosigned By    Initials Name Provider Type    AB Shen Barrera, OT Occupational Therapist                       Time Calculation:   OT Start Time: 1100  OT Stop Time: 1145  OT Time Calculation (min): 45 min  Total Timed Code Minutes- OT: 45 minute(s)    Therapy Charges for Today     Code Description Service Date Service Provider Modifiers Qty    55573301775  OT THERAPEUTIC ACT EA 15 MIN 6/26/2019 Shen Barrera, OT GO 2    [id number]  OT THER PROC EA 15 MIN 6/26/2019 Shen Barrera OT GO 1                    Shen Barrera OT  6/26/2019

## 2019-07-01 ENCOUNTER — HOSPITAL ENCOUNTER (OUTPATIENT)
Dept: OCCUPATIONAL THERAPY | Facility: HOSPITAL | Age: 69
Setting detail: THERAPIES SERIES
Discharge: HOME | End: 2019-07-01

## 2019-07-01 ENCOUNTER — HOSPITAL ENCOUNTER (OUTPATIENT)
Dept: PHYSICAL THERAPY | Facility: HOSPITAL | Age: 69
Setting detail: THERAPIES SERIES
Discharge: HOME | End: 2019-07-01

## 2019-07-01 DIAGNOSIS — Z78.9 IMPAIRED MOBILITY AND ADLS: ICD-10-CM

## 2019-07-01 DIAGNOSIS — R53.1 GENERALIZED WEAKNESS: Primary | ICD-10-CM

## 2019-07-01 DIAGNOSIS — Z74.09 IMPAIRED MOBILITY AND ADLS: ICD-10-CM

## 2019-07-01 DIAGNOSIS — R29.6 FREQUENT FALLS: Primary | ICD-10-CM

## 2019-07-01 PROCEDURE — 97530 THERAPEUTIC ACTIVITIES: CPT

## 2019-07-01 PROCEDURE — 97110 THERAPEUTIC EXERCISES: CPT

## 2019-07-01 NOTE — THERAPY TREATMENT NOTE
"    Outpatient Physical Therapy Ortho Treatment Note  [facility]     Patient Name: Laura West  : 1950  MRN: [id number]  Today's Date: 2019      Visit Date: 2019    Attendance:  (12 from 2019 to 2019)  Subjective Improvement: \"a bit\"  Next MD Appt: PRN  Recert Date: 7/15/19    Subjective: patient reports feeling well today. Her grandson is visiting and she has been busy with him all weekend.       Visit Dx:    ICD-10-CM ICD-9-CM   1. Frequent falls R29.6 V15.88       Patient Active Problem List   Diagnosis   • Pseudophakia   • Diabetes mellitus without complication (CMS/HCC)        Past Medical History:   Diagnosis Date   • Acute anterior uveitis     OD, improved      • Acute bronchitis    • Acute gouty arthropathy     left 1st MTP joint    • Acute sinusitis    • Artificial lens present     IN POSITION - OU   • Benign neoplasm of skin of eyelid      s/p excision hidrocystoma RLL   • Cellulitis     rt distal arm mild      • Common cold    • Contusion    • Cortical senile cataract     OS   • Cough    • Dysuria    • Gout    • Gouty arthropathy    • Hand pain    • Hyperlipidemia    • Hypertension    • Knee pain    • Posterior subcapsular polar age-related cataract     OS   • Type 2 diabetes with complication (CMS/HCC)    • Vertigo    • Vitreous floaters     asteroid hyalosis OS    • Vitreous opacities     asteroid OS           Past Surgical History:   Procedure Laterality Date   • CATARACT EXTRACTION  2014    Remove cataract, insert lens (Left eye.)   • CATARACT EXTRACTION  2014    Remove cataract, insert lens (Right eye.)   • CATARACT EXTRACTION Bilateral    • EXCISION LESION  2014    EYELID EXCISION LESION 57512 (Benign neoplasm of skin of eyelid)    • INJECTION OF MEDICATION  2012    KENALOG   • OTHER SURGICAL HISTORY  2014    OPTICAL BIOMETRY 45092 (1)                          PT Assessment/Plan     Row Name 19 1539          PT " "Assessment    Assessment Comments  Patient tolerated all activities in PT well today. She initially had some L leg pain but it subsided quickly. Balance continues to be the primary concern and is improving.   -        PT Plan    PT Plan Comments  continue plan per [place].   -       User Key  (r) = Recorded By, (t) = Taken By, (c) = Cosigned By    Initials Name Provider Type    Raleigh Patino, PT DPT Physical Therapist            Exercises     Row Name 07/01/19 1534 07/01/19 1400          Total Minutes    35400 - PT Therapeutic Exercise Minutes  40  -JM  --        Exercise 2    Exercise Name 2  --  Pro II level 2  -JM     Time 2  --  12'  -JM        Exercise 3    Exercise Name 3  --  8\" toe touches  -     Cueing 3  --  Verbal  -JM     Sets 3  --  2  -JM     Reps 3  --  10  -JM        Exercise 4    Exercise Name 4  --  4\" step ups  -JM     Cueing 4  --  Verbal;Demo  -JM     Sets 4  --  2  -JM     Reps 4  --  15  -JM        Exercise 5    Exercise Name 5  --  Sitting marches  -JM     Cueing 5  --  Verbal;Demo  -JM     Sets 5  --  2  -JM     Reps 5  --  15  -JM        Exercise 6    Exercise Name 6  --  Sitting LAQ  -JM     Cueing 6  --  Verbal;Demo  -JM     Sets 6  --  2  -JM     Reps 6  --  10  -JM        Exercise 7    Exercise Name 7  --  sitting hip flexion marching  -JM     Cueing 7  --  Verbal  -JM     Sets 7  --  2  -JM     Reps 7  --  10  -JM        Exercise 8    Exercise Name 8  --  Sideways walking with UE support  -JM     Cueing 8  --  Verbal  -JM     Sets 8  --  5  -JM     Reps 8  --  15ft L and R  -       User Key  (r) = Recorded By, (t) = Taken By, (c) = Cosigned By    Initials Name Provider Type    Raleigh Patino, PT DPT Physical Therapist                          Time Calculation:   Start Time: 1350  Stop Time: 1430  Time Calculation (min): 40 min  Therapy Charges for Today     Code Description Service Date Service Provider Modifiers Qty    [id number]  PT THER PROC EA 15 MIN 7/1/2019 " Raleigh Parsons, PT DPT GP 3                    Raleigh Parsons, PT DPT  7/1/2019

## 2019-07-01 NOTE — THERAPY TREATMENT NOTE
Outpatient Occupational Therapy Rehab Program Treatment  [facility]     Patient Name: Laura West  : 1950  MRN: [id number]  Today's Date: 2019        Visit Date: 2019  Visit Number:   Recert Date: 2019  % Improvement: TBD  MD visit date: TBD      Total Insurance visits approved: Med [employer]ec; 16 V by 2019    Patient Active Problem List   Diagnosis   • Pseudophakia   • Diabetes mellitus without complication (CMS/HCC)        Past Medical History:   Diagnosis Date   • Acute anterior uveitis     OD, improved      • Acute bronchitis    • Acute gouty arthropathy     left 1st MTP joint    • Acute sinusitis    • Artificial lens present     IN POSITION - OU   • Benign neoplasm of skin of eyelid      s/p excision hidrocystoma RLL   • Cellulitis     rt distal arm mild      • Common cold    • Contusion    • Cortical senile cataract     OS   • Cough    • Dysuria    • Gout    • Gouty arthropathy    • Hand pain    • Hyperlipidemia    • Hypertension    • Knee pain    • Posterior subcapsular polar age-related cataract     OS   • Type 2 diabetes with complication (CMS/HCC)    • Vertigo    • Vitreous floaters     asteroid hyalosis OS    • Vitreous opacities     asteroid OS           Past Surgical History:   Procedure Laterality Date   • CATARACT EXTRACTION  2014    Remove cataract, insert lens (Left eye.)   • CATARACT EXTRACTION  2014    Remove cataract, insert lens (Right eye.)   • CATARACT EXTRACTION Bilateral    • EXCISION LESION  2014    EYELID EXCISION LESION 01021 (Benign neoplasm of skin of eyelid)    • INJECTION OF MEDICATION  2012    KENALOG   • OTHER SURGICAL HISTORY  2014    OPTICAL BIOMETRY 51523 (1)            Visit Dx:    ICD-10-CM ICD-9-CM   1. Generalized weakness R53.1 780.79   2. Impaired mobility and ADLs Z74.09 799.89         OT Neuro     Row Name 19 1436             Subjective Comments    Subjective Comments  Pt present following PT session  this date. She reports pain has decreased since arriving to therapy. Pt reports she does not have to wear wrist support as frequently.  -AB         Subjective Pain    Pre-Treatment Pain Level  2  -AB      Subjective Pain Comment  Neck and shoulders  -AB         Cognitive Assessment/Intervention    Current Cognitive/Communication Assessment  functional  -AB      Orientation Status (Cognition)  oriented x 4  -AB        User Key  (r) = Recorded By, (t) = Taken By, (c) = Cosigned By    Initials Name Provider Type    Shen Murray OT Occupational Therapist                  Therapy Education  Education Details: HEP  Given: HEP  Program: Reinforced, Progressed  How Provided: Verbal, Demonstration  Provided to: Patient  Level of Understanding: Verbalized, Demonstrated    OT Assessment/Plan     Row Name 07/01/19 1600          OT Assessment    Assessment Comments  OT tx session tolerated well. Pt demonstrates ability to engage in more BUE therapeutic exercises; however, she continues to require frequent rest breaks.  Pt engaged in FMC task well with bilateral hands. She continues to present with pain in neck, deficits with BUE strength,  strength, bilateral coordination, and activity tolerance. Continue OT services to address these deficits.  -AB        OT Plan    OT Frequency  2x/week  -AB     Predicted Duration of Therapy Intervention (Therapy Eval)  6-8 weeks w/further TBD  -AB     OT Plan Comments  Progress with skilled OT services through BUE therapeutic exercises to improve BUE strength and activity tolerance for ADLs and IADLs. She would also benefit from therapeutic activities to improve FMC and bilateral coordination to increase independence and participation in ADLs and IADLs.  -AB       User Key  (r) = Recorded By, (t) = Taken By, (c) = Cosigned By    Initials Name Provider Type    Shen Murray OT Occupational Therapist           OT Goals     Row Name 07/01/19 1436          OT Short Term Goals     STG Date to Achieve  06/24/19  -AB     STG 1  Pt will report compliance with HEP 3/3 times.  -AB     STG 1 Progress  Progressing;Partially Met  -AB     STG 1 Progress Comments  Met 1/1 times.  -AB     STG 2  Pt will complete FMC activity with 75% accuracy and 0-1 drops 2/3 times to improve fine motor control for ADLs and IADLs.  -AB     STG 2 Progress  Partially Met;Progressing  -AB     STG 3  Improve L  strength by 5# average (38#) 2/3 times to improve functional independence in ADLs and IADLs.  -AB     STG 3 Progress  Progressing  -AB     STG 4  Pt will complete pinch assessment to determine deficits with pinch strength.  -AB     STG 4 Progress  Met  -AB     STG 5  Pt will engage in 15 min BUE exercises with 3 rest breaks as needed 2/3 times to improve BUE strength and activity tolerance for ADLs and IADLs.  -AB     STG 5 Progress  Progressing  -AB        Long Term Goals    LTG Date to Achieve  07/24/19  -AB     LTG 1  Pt will improve LUE shoulder flexion and extension strength to 4+/5 during MMT 3 times to improve strength and independence for ADLs and IADLs.  -AB     LTG 1 Progress  Progressing  -AB     LTG 2  Pt will improve BUE elbow flexion and extension to 4+/5 during MMT 3 times to improve strength for ADLs and IADLs.  -AB     LTG 2 Progress  Progressing  -AB     LTG 3  Pt will engage in 15 min BUE exercises with 0-1 rest breaks to improve activity tolerance and BUE strength for ADLs and IADLs.  -AB     LTG 3 Progress  Progressing  -AB     LTG 4  Pt will engage in BUE FMC task while standing for 8 min 2/3 times to improve functional independence during ADLs and IADLs.  -AB     LTG 4 Progress  Progressing  -AB     LTG 5  Pt will improve R hand 9 hole peg test to 22.5 seconds 2/3 times to improve FMC and coordination for ADLs and IADLs.  -AB     LTG 5 Progress  Progressing  -AB     LTG 6  Pt will improve L hand 9 hole peg test to 25 seconds 2/3 times to improve FMC and coordination for ADLs and IADLs.   -AB     LTG 6 Progress  Progressing  -AB     LTG 7  Pt will complete BUE bilateral coordination task with 90% accuracy to improve participation in ADLs and IADLs.  -AB     LTG 7 Progress  Progressing  -AB     LTG 8  Improve L  strength by 10# average (43#) to improve successful engagement in ADLs and IADLs.  -AB     LTG 8 Progress  Progressing  -AB        Time Calculation    OT Goal Re-Cert Due Date  06/24/19  -AB       User Key  (r) = Recorded By, (t) = Taken By, (c) = Cosigned By    Initials Name Provider Type    Shen Murray OT Occupational Therapist          OT Exercises     Row Name 07/01/19 1436             Subjective Pain    Able to rate subjective pain?  yes  -AB      Post-Treatment Pain Level  2  -AB         Functional Mobility    Functional Mobility- Device  straight cane  -AB      Functional Mobility- Comment  Ambulated to OT room.  -AB         Exercise 2    Exercise Name 2  2# Dumbells: pt completed bicep curls, supination/pronation, and stir the pots. Mod RBs required.  -AB      Cueing 2  Verbal;Demo  -AB      Sets 2  3  -AB      Reps 2  15  -AB      Intensity 2  Moderate  -AB         Exercise 6    Exercise Name 6  5# Green Digi-Flex: pt completed  and pinch strengthening exercises with B hands for improved hand strength.  -AB      Sets 6  3  -AB      Reps 6  10  -AB      Intensity 6  Moderate  -AB         Exercise 8    Exercise Name 8  Blue BUE Theraband BUE exercises: pt completed bilateral pulling exercises for BUE strength and coordination.  -AB      Equipment 8  Theraband  -AB      Sets 8  2  -AB      Reps 8  15  -AB      Intensity 8  Moderate  -AB         Exercise 11    Exercise Name 11  Button task: pt picked 10 buttons up and placed in coin slot with B hands. Tolerated well..  -AB      Intensity 11  Mild  -AB        User Key  (r) = Recorded By, (t) = Taken By, (c) = Cosigned By    Initials Name Provider Type    Shen Murray OT Occupational Therapist                       Time Calculation:   OT Start Time: 1436  OT Stop Time: 1515  OT Time Calculation (min): 39 min  Total Timed Code Minutes- OT: 39 minute(s)    Therapy Charges for Today     Code Description Service Date Service Provider Modifiers Qty    83067927847  OT THERAPEUTIC ACT EA 15 MIN 7/1/2019 Shen Barrera, OT GO 1    [id number]  OT THER PROC EA 15 MIN 7/1/2019 Shen Barrera OT GO 2                    Shen Barrera OT  7/1/2019

## 2019-07-03 ENCOUNTER — APPOINTMENT (OUTPATIENT)
Dept: OCCUPATIONAL THERAPY | Facility: HOSPITAL | Age: 69
End: 2019-07-03

## 2019-07-08 ENCOUNTER — HOSPITAL ENCOUNTER (OUTPATIENT)
Dept: OCCUPATIONAL THERAPY | Facility: HOSPITAL | Age: 69
Setting detail: THERAPIES SERIES
Discharge: HOME | End: 2019-07-08

## 2019-07-08 ENCOUNTER — HOSPITAL ENCOUNTER (OUTPATIENT)
Dept: PHYSICAL THERAPY | Facility: HOSPITAL | Age: 69
Setting detail: THERAPIES SERIES
Discharge: HOME | End: 2019-07-08

## 2019-07-08 DIAGNOSIS — R53.1 GENERALIZED WEAKNESS: ICD-10-CM

## 2019-07-08 DIAGNOSIS — Z74.09 IMPAIRED MOBILITY AND ADLS: ICD-10-CM

## 2019-07-08 DIAGNOSIS — Z78.9 IMPAIRED MOBILITY AND ADLS: ICD-10-CM

## 2019-07-08 DIAGNOSIS — R53.1 GENERALIZED WEAKNESS: Primary | ICD-10-CM

## 2019-07-08 DIAGNOSIS — R29.6 FREQUENT FALLS: Primary | ICD-10-CM

## 2019-07-08 PROCEDURE — 97530 THERAPEUTIC ACTIVITIES: CPT

## 2019-07-08 PROCEDURE — 97110 THERAPEUTIC EXERCISES: CPT

## 2019-07-08 NOTE — THERAPY TREATMENT NOTE
"    Outpatient Physical Therapy Ortho Treatment Note  [facility]     Patient Name: Laura West  : 1950  MRN: [id number]  Today's Date: 2019      Visit Date: 2019     Subjective Improvement \"I am getting better\"  Visits 7/8  Visits approved 12 from 2019 to 2019  RTMD PRn  Recheck Date 7-    Weakness, frequent falls    Visit Dx:    ICD-10-CM ICD-9-CM   1. Frequent falls R29.6 V15.88   2. Generalized weakness R53.1 780.79       Patient Active Problem List   Diagnosis   • Pseudophakia   • Diabetes mellitus without complication (CMS/HCC)        Past Medical History:   Diagnosis Date   • Acute anterior uveitis     OD, improved      • Acute bronchitis    • Acute gouty arthropathy     left 1st MTP joint    • Acute sinusitis    • Artificial lens present     IN POSITION - OU   • Benign neoplasm of skin of eyelid      s/p excision hidrocystoma RLL   • Cellulitis     rt distal arm mild      • Common cold    • Contusion    • Cortical senile cataract     OS   • Cough    • Dysuria    • Gout    • Gouty arthropathy    • Hand pain    • Hyperlipidemia    • Hypertension    • Knee pain    • Posterior subcapsular polar age-related cataract     OS   • Type 2 diabetes with complication (CMS/HCC)    • Vertigo    • Vitreous floaters     asteroid hyalosis OS    • Vitreous opacities     asteroid OS           Past Surgical History:   Procedure Laterality Date   • CATARACT EXTRACTION  2014    Remove cataract, insert lens (Left eye.)   • CATARACT EXTRACTION  2014    Remove cataract, insert lens (Right eye.)   • CATARACT EXTRACTION Bilateral    • EXCISION LESION  2014    EYELID EXCISION LESION 26644 (Benign neoplasm of skin of eyelid)    • INJECTION OF MEDICATION  2012    KENALOG   • OTHER SURGICAL HISTORY  2014    OPTICAL BIOMETRY 57756 (1)          PT Ortho     Row Name 19 1400       Precautions and Contraindications    Precautions/Limitations  fall precautions  " "-CP       Posture/Observations    Posture/Observations Comments  amb with SC  -CP      User Key  (r) = Recorded By, (t) = Taken By, (c) = Cosigned By    Initials Name Provider Type    Piedad Mathews PTA Physical Therapy Assistant                      PT Assessment/Plan     Row Name 07/08/19 1525          PT Assessment    Assessment Comments  Patient appears to be progressing in LE strenght and balance.  Tolerance increase activity in therapy greater than 45 minutes.  One goal met this date  -CP        PT Plan    PT Frequency  2x/week  -CP     Predicted Duration of Therapy Intervention (Therapy Eval)  4 weeks  -CP     PT Plan Comments  Cont with POC.  Recheck scheduled for next week. up and down ramp in rehab gym  -CP       User Key  (r) = Recorded By, (t) = Taken By, (c) = Cosigned By    Initials Name Provider Type    Piedad Mathews, PTA Physical Therapy Assistant            Exercises     Row Name 07/08/19 1400             Subjective Comments    Subjective Comments  Patient denies any falls but did have some stumbles over the weekend.  -CP         Subjective Pain    Able to rate subjective pain?  yes  -CP      Pre-Treatment Pain Level  3  -CP      Post-Treatment Pain Level  3  -CP      Subjective Pain Comment  neck pain+  -CP         Exercise 2    Exercise Name 2  Pro II level 2  -CP      Time 2  12  -CP         Exercise 3    Exercise Name 3  LAQ  -CP      Cueing 3  Verbal  -CP      Sets 3  2  -CP      Reps 3  10  -CP      Time 3  2 lb  -CP         Exercise 4    Exercise Name 4  side stepping at table  -CP      Cueing 4  Verbal  -CP      Reps 4  5  -CP      Time 4  2 lb  -CP         Exercise 5    Exercise Name 5  standing marching  -CP      Cueing 5  Verbal;Demo  -CP      Sets 5  2  -CP      Reps 5  10  -CP      Time 5  2 lb  -CP         Exercise 6    Exercise Name 6  stance on airex.  -CP      Cueing 6  Verbal;Demo  -CP      Sets 6  3  -CP      Reps 6  30\"  -CP      Time 6  independent  -CP         " "Exercise 7    Exercise Name 7  step up 4\"  -CP      Cueing 7  Verbal;Demo  -CP      Sets 7  2  -CP      Reps 7  10  -CP      Time 7  bilateral  -CP        User Key  (r) = Recorded By, (t) = Taken By, (c) = Cosigned By    Initials Name Provider Type    CP Piedad Howe PTA Physical Therapy Assistant                       PT OP Goals     Row Name 07/08/19 1500          PT Short Term Goals    STG Date to Achieve  06/24/19  -CP     STG 1  Patient will be independent home exercise program  -CP     STG 1 Progress  Ongoing;Progressing  -CP     STG 2  Patient to tolerate 45 minutes exercise program  -CP     STG 2 Progress  Met  -CP     STG 3  Patient to have hip flexor MMT L 4+  -CP     STG 3 Progress  Progressing  -CP     STG 4  Patient have hip abductor MMT Jas Left 4, R 4+  -CP     STG 4 Progress  Progressing  -CP     STG 5  Patient have hip ext MMT 3/5  -CP     STG 5 Progress  Progressing  -CP        Long Term Goals    LTG 1  Patient have hamstring MMT 5/5 left  -CP     LTG 1 Progress  Progressing  -CP     LTG 2  Pt to not report any further falls  -CP     LTG 2 Progress  Progressing  -CP        Time Calculation    PT Goal Re-Cert Due Date  07/15/19  -CP       User Key  (r) = Recorded By, (t) = Taken By, (c) = Cosigned By    Initials Name Provider Type    CP Piedad Howe PTA Physical Therapy Assistant                         Time Calculation:   Start Time: 1433  Stop Time: 1520  Time Calculation (min): 47 min  Total Timed Code Minutes- PT: 47 minute(s)  Therapy Charges for Today     Code Description Service Date Service Provider Modifiers Qty    44846794137 HC PT THER PROC EA 15 MIN 7/8/2019 Piedad Howe PTA GP 3                    Piedad Howe PTA  7/8/2019     "

## 2019-07-08 NOTE — THERAPY PROGRESS REPORT/RE-CERT
Outpatient Occupational Therapy Rehab Program Progress Note  [facility]     Patient Name: Laura West  : 1950  MRN: [id number]  Today's Date: 2019      Visit Date: 2019   Visit Number:   Recert Date: 2019  % Improvement: TBD  MD visit date: TBD      Total Insurance visits approved: [employer]; 16 V by 2019      Patient Active Problem List   Diagnosis   • Pseudophakia   • Diabetes mellitus without complication (CMS/HCC)        Past Medical History:   Diagnosis Date   • Acute anterior uveitis     OD, improved      • Acute bronchitis    • Acute gouty arthropathy     left 1st MTP joint    • Acute sinusitis    • Artificial lens present     IN POSITION - OU   • Benign neoplasm of skin of eyelid      s/p excision hidrocystoma RLL   • Cellulitis     rt distal arm mild      • Common cold    • Contusion    • Cortical senile cataract     OS   • Cough    • Dysuria    • Gout    • Gouty arthropathy    • Hand pain    • Hyperlipidemia    • Hypertension    • Knee pain    • Posterior subcapsular polar age-related cataract     OS   • Type 2 diabetes with complication (CMS/HCC)    • Vertigo    • Vitreous floaters     asteroid hyalosis OS    • Vitreous opacities     asteroid OS           Past Surgical History:   Procedure Laterality Date   • CATARACT EXTRACTION  2014    Remove cataract, insert lens (Left eye.)   • CATARACT EXTRACTION  2014    Remove cataract, insert lens (Right eye.)   • CATARACT EXTRACTION Bilateral    • EXCISION LESION  2014    EYELID EXCISION LESION 98379 (Benign neoplasm of skin of eyelid)    • INJECTION OF MEDICATION  2012    KENALOG   • OTHER SURGICAL HISTORY  2014    OPTICAL BIOMETRY 12598 (1)            Visit Dx:    ICD-10-CM ICD-9-CM   1. Generalized weakness R53.1 780.79   2. Impaired mobility and ADLs Z74.09 799.89             OT Neuro     Row Name 19 1500             Subjective Comments    Subjective Comments  Pt reports impaired  strength when holding a platter with RUE.  -AB         Precautions and Contraindications    Precautions/Limitations  fall precautions  -AB         Subjective Pain    Able to rate subjective pain?  yes  -AB      Pre-Treatment Pain Level  3  -AB      Post-Treatment Pain Level  3  -AB      Subjective Pain Comment  Neck  -AB         Cognitive Assessment/Intervention    Current Cognitive/Communication Assessment  functional  -AB      Orientation Status (Cognition)  oriented x 4  -AB         Coordination    9-Hole Peg Left  27.84 sec  -AB      9-Hole Peg Right  26.94 sec  -AB         MMT Right Upper Ext    Rt Shoulder Flexion MMT, Gross Movement  (4+/5) good plus  -AB      Rt Shoulder Extension MMT, Gross Movement  (4+/5) good plus  -AB      Rt Shoulder Internal Rotation MMT, Gross Movement  (4/5) good  -AB      Rt Shoulder External Rotation MMT, Gross Movement  (4/5) good  -AB      Rt Elbow Flexion MMT, Gross Movement:  (4-/5) good minus  -AB      Rt Elbow Extension MMT, Gross Movement:  (4-/5) good minus  -AB      Rt Forearm Supination MMT, Gross Movement  (4-/5) good minus  -AB      Rt Forearm Pronation MMT, Gross Movement  (4-/5) good minus  -AB         MMT Left Upper Ext    Lt Shoulder Flexion MMT, Gross Movement  (4-/5) good minus  -AB      Lt Shoulder Extension MMT, Gross Movement  (4-/5) good minus  -AB      Lt Shoulder Internal Rotation MMT, Gross Movement  (4/5) good  -AB      Lt Shoulder External Rotation MMT, Gross Movement  (4/5) good  -AB      Lt Elbow Flexion MMT, Gross Movement  (4-/5) good minus  -AB      Lt Elbow Extension MMT, Gross Movement  (4-/5) good minus  -AB      Lt Forearm Supination MMT, Gross Movement  (4-/5) good minus  -AB      Lt Forearm Pronation MMT, Gross Movement  (4-/5) good minus  -AB      Lt Wrist Flexion MMT, Gross Movement  (5/5) normal  -AB      Lt Wrist Extension MMT, Gross Movement  (5/5) normal  -AB         Functional Mobility    Functional Mobility- Device  straight cane  -AB         User Key  (r) = Recorded By, (t) = Taken By, (c) = Cosigned By    Initials Name Provider Type    Shen Murray OT Occupational Therapist           Hand Therapy (last 24 hours)      Hand Josr     Row Name 07/08/19 1500              Strength Right    # Reps  3  -AB      Right Rung  2  -AB      Right  Test 1  55  -AB      Right  Test 2  44  -AB      Right  Test 3  46  -AB       Strength Average Right  48.33  -AB          Strength Left    # Reps  3  -AB      Left Rung  2  -AB      Left  Test 1  35  -AB      Left  Test 2  36  -AB      Left  Test 3  41  -AB       Strength Average Left  37.33  -AB        User Key  (r) = Recorded By, (t) = Taken By, (c) = Cosigned By    Initials Name Provider Type    Shen Murray OT Occupational Therapist                Therapy Education  Education Details: HEP: theraband exercises added for BUEs  Given: HEP  Program: Reinforced, Progressed  How Provided: Verbal, Demonstration, Written  Provided to: Patient  Level of Understanding: Verbalized, Demonstrated, Teach back education performed    OT Goals     Row Name 07/08/19 1628 07/08/19 1515       OT Short Term Goals    STG Date to Achieve  --  06/24/19  -AB    STG 1  --  Pt will report compliance with HEP 3/3 times.  -AB    STG 1 Progress  --  Met  (Significant)   -AB    STG 2  --  Pt will complete FMC activity with 75% accuracy and 0-1 drops 2/3 times to improve fine motor control for ADLs and IADLs.  -AB    STG 2 Progress  --  Partially Met;Progressing;Not Met  -AB    STG 3  --  Improve L  strength by 5# average (38#) 2/3 times to improve functional independence in ADLs and IADLs.  -AB    STG 3 Progress  --  Progressing;Not Met  -AB    STG 3 Progress Comments  --  Pt improved L  strength to 37.33# average this date.  -AB    STG 4  --  Pt will complete pinch assessment to determine deficits with pinch strength.  -AB    STG 4 Progress  --  Met  -AB    STG 5  --  Pt will  engage in 15 min BUE exercises with 3 rest breaks as needed 2/3 times to improve BUE strength and activity tolerance for ADLs and IADLs.  -AB    STG 5 Progress  --  Progressing;Not Met  -AB       Long Term Goals    LTG Date to Achieve  --  07/24/19  -AB    LTG 1  --  Pt will improve LUE shoulder flexion and extension strength to 4+/5 during MMT 3 times to improve strength and independence for ADLs and IADLs.  -AB    LTG 1 Progress  --  Progressing;Not Met  -AB    LTG 1 Progress Comments  --  Improved to 4-/5 this date.  -AB    LTG 2  --  Pt will improve BUE elbow flexion and extension to 4+/5 during MMT 3 times to improve strength for ADLs and IADLs.  -AB    LTG 2 Progress  --  Progressing;Not Met  -AB    LTG 2 Progress Comments  --  Improved to 4-/5 this date.  -AB    LTG 3  --  Pt will engage in 15 min BUE exercises with 0-1 rest breaks to improve activity tolerance and BUE strength for ADLs and IADLs.  -AB    LTG 3 Progress  --  Progressing;Not Met  -AB    LTG 4  --  Pt will engage in BUE FMC task while standing for 8 min 2/3 times to improve functional independence during ADLs and IADLs.  -AB    LTG 4 Progress  --  Progressing;Not Met  -AB    LTG 5  --  Pt will improve R hand 9 hole peg test to 22.5 seconds 2/3 times to improve FMC and coordination for ADLs and IADLs.  -AB    LTG 5 Progress  --  Progressing;Not Met  -AB    LTG 6  --  Pt will improve L hand 9 hole peg test to 25 seconds 2/3 times to improve FMC and coordination for ADLs and IADLs.  -AB    LTG 6 Progress  --  Progressing;Not Met  -AB    LTG 7  --  Pt will complete BUE bilateral coordination task with 90% accuracy to improve participation in ADLs and IADLs.  -AB    LTG 7 Progress  --  Progressing;Not Met  -AB    LTG 8  --  Improve L  strength by 10# average (43#) to improve successful engagement in ADLs and IADLs.  -AB    LTG 8 Progress  --  Progressing;Not Met  -AB       Time Calculation    OT Goal Re-Cert Due Date  07/29/19  -AB  --      User  Key  (r) = Recorded By, (t) = Taken By, (c) = Cosigned By    Initials Name Provider Type    Shen Murray, OT Occupational Therapist        OT Assessment/Plan     Row Name 07/08/19 1600 07/08/19 1525       OT Assessment    Assessment Comments  OT recert completed this date. Pt is making good progress towards goals. She met STG #1 by reporting compliance with HEP 3/3 times. She demonstrates improvement with BUE FMC during 9 hole peg test, as well as improvements with BUE strength and  strength. Pt continues to present with impaired BUE strength,  strength, and FMC. Continue skilled OT services to address these deficits.  -AB  --    OT Rehab Potential  Excellent  -AB  --    Patient/caregiver participated in establishment of treatment plan and goals  Yes  -AB  --    Patient would benefit from skilled therapy intervention  Yes  -AB  --       OT Plan    OT Frequency  2x/week  -AB  --    Predicted Duration of Therapy Intervention (Therapy Eval)  6 weeks w/further TBD  -AB  4 weeks  -CP    OT Plan Comments  Progress with skilled OT services through BUE therapeutic exercises to improve BUE strength and activity tolerance for ADLs and IADLs. She would also benefit from therapeutic activities to improve FMC and bilateral coordination to increase independence and participation in ADLs and IADLs.  -AB  --      User Key  (r) = Recorded By, (t) = Taken By, (c) = Cosigned By    Initials Name Provider Type    Piedad Mathews, PTA Physical Therapy Assistant    AB Shen Barrera, OT Occupational Therapist        OT Exercises     Row Name 07/08/19 1500             Exercise 8    Exercise Name 8  Blue BUE Theraband Exercises: pt completed bilateral pulling exercises, bicep curls, shoulder flexion, and shoulder extension exercises.  -AB      Cueing 8  Demo  -AB      Equipment 8  Theraband  -AB      Sets 8  3  -AB      Reps 8  15  -AB        User Key  (r) = Recorded By, (t) = Taken By, (c) = Cosigned By    Initials  Name Provider Type    AB Shen Barrera, JON Occupational Therapist            9 Hole Peg  9-Hole Peg Left: 27.84 sec  9-Hole Peg Right: 26.94 sec         Time Calculation:   OT Start Time: 1520  OT Stop Time: 1610  OT Time Calculation (min): 50 min  Total Timed Code Minutes- OT: 50 minute(s)     Therapy Charges for Today     Code Description Service Date Service Provider Modifiers Qty    31631669756  OT THERAPEUTIC ACT EA 15 MIN 7/8/2019 Shen Barrera, OT GO 2    [id number]  OT THER PROC EA 15 MIN 7/8/2019 Shen Barrera, OT GO 1                  Shen Barrera OT  7/8/2019

## 2019-07-10 ENCOUNTER — HOSPITAL ENCOUNTER (OUTPATIENT)
Dept: OCCUPATIONAL THERAPY | Facility: HOSPITAL | Age: 69
Setting detail: THERAPIES SERIES
End: 2019-07-10

## 2019-07-10 ENCOUNTER — APPOINTMENT (OUTPATIENT)
Dept: PHYSICAL THERAPY | Facility: HOSPITAL | Age: 69
End: 2019-07-10

## 2019-07-10 ENCOUNTER — HOSPITAL ENCOUNTER (OUTPATIENT)
Dept: PHYSICAL THERAPY | Facility: HOSPITAL | Age: 69
Setting detail: THERAPIES SERIES
Discharge: HOME | End: 2019-07-10

## 2019-07-10 DIAGNOSIS — R29.6 FREQUENT FALLS: Primary | ICD-10-CM

## 2019-07-10 PROCEDURE — 97112 NEUROMUSCULAR REEDUCATION: CPT

## 2019-07-10 PROCEDURE — 97110 THERAPEUTIC EXERCISES: CPT

## 2019-07-10 NOTE — THERAPY TREATMENT NOTE
Outpatient Physical Therapy Ortho Treatment Note  [facility]     Patient Name: Laura West  : 1950  MRN: [id number]  Today's Date: 7/10/2019      Visit Date: 07/10/2019    Subjective Improvement 20%  Visits 8/9  Visits approved 12 from 2019 to 2019  RTMD PRn  Recheck Date 7-     Visit Dx:    ICD-10-CM ICD-9-CM   1. Frequent falls R29.6 V15.88       Patient Active Problem List   Diagnosis   • Pseudophakia   • Diabetes mellitus without complication (CMS/HCC)        Past Medical History:   Diagnosis Date   • Acute anterior uveitis     OD, improved      • Acute bronchitis    • Acute gouty arthropathy     left 1st MTP joint    • Acute sinusitis    • Artificial lens present     IN POSITION - OU   • Benign neoplasm of skin of eyelid      s/p excision hidrocystoma RLL   • Cellulitis     rt distal arm mild      • Common cold    • Contusion    • Cortical senile cataract     OS   • Cough    • Dysuria    • Gout    • Gouty arthropathy    • Hand pain    • Hyperlipidemia    • Hypertension    • Knee pain    • Posterior subcapsular polar age-related cataract     OS   • Type 2 diabetes with complication (CMS/HCC)    • Vertigo    • Vitreous floaters     asteroid hyalosis OS    • Vitreous opacities     asteroid OS           Past Surgical History:   Procedure Laterality Date   • CATARACT EXTRACTION  2014    Remove cataract, insert lens (Left eye.)   • CATARACT EXTRACTION  2014    Remove cataract, insert lens (Right eye.)   • CATARACT EXTRACTION Bilateral    • EXCISION LESION  2014    EYELID EXCISION LESION 61955 (Benign neoplasm of skin of eyelid)    • INJECTION OF MEDICATION  2012    KENALOG   • OTHER SURGICAL HISTORY  2014    OPTICAL BIOMETRY 68483 (1)          PT Ortho     Row Name 19 1400       Precautions and Contraindications    Precautions/Limitations  fall precautions  -CP       Posture/Observations    Posture/Observations Comments  amb with SC  -CP     "  User Key  (r) = Recorded By, (t) = Taken By, (c) = Cosigned By    Initials Name Provider Type    Piedad Mathews, PTA Physical Therapy Assistant                      PT Assessment/Plan     Row Name 07/10/19 1636          PT Assessment    Assessment Comments  Patient is progressing well in PT. She reports that she has increased confidence when moving around her home, but is not \"moving too fast\". PT will continue to progress strengthening and balance activities to promote independent and functional mobility.   -        PT Plan    PT Plan Comments  Continue plan per eval, progress balance activities.   -       User Key  (r) = Recorded By, (t) = Taken By, (c) = Cosigned By    Initials Name Provider Type    Raleigh Patino, PT DPT Physical Therapist            Exercises     Row Name 07/10/19 1638 07/10/19 0900          Subjective Comments    Subjective Comments  --  Patient reports feeling well toaday, she did have an episode of dehydration over the weekend that required an ED visit and IV fluids. She has felt well since then  -        Total Minutes    77370 - PT Therapeutic Exercise Minutes  30  -JM  --     87705 -  PT Neuromuscular Reeducation Minutes  15  -JM  --        Exercise 2    Exercise Name 2  --  Pro II level 2  -     Cueing 2  --  Verbal  -JM     Time 2  --  11'  -JM        Exercise 3    Exercise Name 3  --  LAQ  -     Cueing 3  --  Verbal  -     Sets 3  --  2  -JM     Reps 3  --  15  -JM     Additional Comments  --  2#  -JM        Exercise 4    Exercise Name 4  --  side stepping at table  -     Cueing 4  --  Verbal  -JM     Reps 4  --  10  -JM     Time 4  --  10 ft ea  -        Exercise 5    Exercise Name 5  --  Sitting marches  -     Cueing 5  --  Verbal  -JM     Sets 5  --  2  -JM     Reps 5  --  20  -JM        Exercise 6    Exercise Name 6  --  Toe touches on 8\" stool  -JM     Sets 6  --  3  -JM     Reps 6  --  20  -JM        Exercise 7    Exercise Name 7  --  4' adama up  -JM  "    Cueing 7  --  Verbal  -     Sets 7  --  2  -     Reps 7  --  10  -JM        Exercise 8    Exercise Name 8  --  sitting heel/toe rocks  -     Cueing 8  --  Verbal  -     Sets 8  --  2  -JM     Reps 8  --  20  -JM        Exercise 9    Exercise Name 9  --  Sitting Hip abduction/adduction against Pt resistance  -     Sets 9  --  1  -JM     Reps 9  --  10  -JM       User Key  (r) = Recorded By, (t) = Taken By, (c) = Cosigned By    Initials Name Provider Type    Raleigh Patino, PT DPT Physical Therapist                       PT OP Goals     Row Name 07/10/19 1600          PT Short Term Goals    STG Date to Achieve  06/24/19  -     STG 1  Patient will be independent home exercise program  -     STG 1 Progress  Ongoing;Progressing  -     STG 2  Patient to tolerate 45 minutes exercise program  -     STG 2 Progress  Met  -     STG 3  Patient to have hip flexor MMT L 4+  -     STG 3 Progress  Progressing  -     STG 4  Patient have hip abductor MMT Jas Left 4, R 4+  -     STG 4 Progress  Progressing  -     STG 5  Patient have hip ext MMT 3/5  -     STG 5 Progress  Progressing  -        Long Term Goals    LTG 1  Patient have hamstring MMT 5/5 left  -     LTG 1 Progress  Progressing  -     LTG 2  Pt to not report any further falls  -     LTG 2 Progress  Progressing  -       User Key  (r) = Recorded By, (t) = Taken By, (c) = Cosigned By    Initials Name Provider Type    Raleigh Patino, PT DPT Physical Therapist                         Time Calculation:   Start Time: 0940  Stop Time: 1025  Time Calculation (min): 45 min  Total Timed Code Minutes- PT: 45 minute(s)  Therapy Charges for Today     Code Description Service Date Service Provider Modifiers Qty    44748766331 HC PT NEUROMUSC RE EDUCATION EA 15 MIN 7/10/2019 Raleigh Parsons, PT DPT GP 1    29322782072 HC PT THER PROC EA 15 MIN 7/10/2019 Raleigh Parsons, PT DPT GP 2                    Raleigh Parsons PT DPT  7/10/2019

## 2019-07-15 ENCOUNTER — APPOINTMENT (OUTPATIENT)
Dept: PHYSICAL THERAPY | Facility: HOSPITAL | Age: 69
End: 2019-07-15

## 2019-07-15 ENCOUNTER — APPOINTMENT (OUTPATIENT)
Dept: OCCUPATIONAL THERAPY | Facility: HOSPITAL | Age: 69
End: 2019-07-15

## 2019-07-17 ENCOUNTER — HOSPITAL ENCOUNTER (OUTPATIENT)
Dept: OCCUPATIONAL THERAPY | Facility: HOSPITAL | Age: 69
Setting detail: THERAPIES SERIES
Discharge: HOME | End: 2019-07-17

## 2019-07-17 ENCOUNTER — HOSPITAL ENCOUNTER (OUTPATIENT)
Dept: PHYSICAL THERAPY | Facility: HOSPITAL | Age: 69
Setting detail: THERAPIES SERIES
Discharge: HOME | End: 2019-07-17

## 2019-07-17 DIAGNOSIS — Z74.09 IMPAIRED MOBILITY AND ADLS: ICD-10-CM

## 2019-07-17 DIAGNOSIS — Z78.9 IMPAIRED MOBILITY AND ADLS: ICD-10-CM

## 2019-07-17 DIAGNOSIS — R29.6 FREQUENT FALLS: Primary | ICD-10-CM

## 2019-07-17 DIAGNOSIS — R53.1 GENERALIZED WEAKNESS: Primary | ICD-10-CM

## 2019-07-17 PROCEDURE — 97530 THERAPEUTIC ACTIVITIES: CPT

## 2019-07-17 PROCEDURE — 97112 NEUROMUSCULAR REEDUCATION: CPT

## 2019-07-17 PROCEDURE — 97110 THERAPEUTIC EXERCISES: CPT

## 2019-07-17 NOTE — THERAPY PROGRESS REPORT/RE-CERT
Outpatient Physical Therapy Ortho Progress Note  [facility]     Patient Name: Laura West  : 1950  MRN: [id number]  Today's Date: 2019      Visit Date: 2019  Subjective Improvement 25%  Visits 9/10  Visits approved 12 from 2019 to 2019  RTMD PRN  Recheck Date 19           Visit Dx:    ICD-10-CM ICD-9-CM   1. Frequent falls R29.6 V15.88       Patient Active Problem List   Diagnosis   • Pseudophakia   • Diabetes mellitus without complication (CMS/HCC)        Past Medical History:   Diagnosis Date   • Acute anterior uveitis     OD, improved      • Acute bronchitis    • Acute gouty arthropathy     left 1st MTP joint    • Acute sinusitis    • Artificial lens present     IN POSITION - OU   • Benign neoplasm of skin of eyelid      s/p excision hidrocystoma RLL   • Cellulitis     rt distal arm mild      • Common cold    • Contusion    • Cortical senile cataract     OS   • Cough    • Dysuria    • Gout    • Gouty arthropathy    • Hand pain    • Hyperlipidemia    • Hypertension    • Knee pain    • Posterior subcapsular polar age-related cataract     OS   • Type 2 diabetes with complication (CMS/HCC)    • Vertigo    • Vitreous floaters     asteroid hyalosis OS    • Vitreous opacities     asteroid OS           Past Surgical History:   Procedure Laterality Date   • CATARACT EXTRACTION  2014    Remove cataract, insert lens (Left eye.)   • CATARACT EXTRACTION  2014    Remove cataract, insert lens (Right eye.)   • CATARACT EXTRACTION Bilateral    • EXCISION LESION  2014    EYELID EXCISION LESION 40495 (Benign neoplasm of skin of eyelid)    • INJECTION OF MEDICATION  2012    KENALOG   • OTHER SURGICAL HISTORY  2014    OPTICAL BIOMETRY 54308 (1)          PT Ortho     Row Name 19 0900       Precautions and Contraindications    Precautions/Limitations  fall precautions  -JM    Contraindications  pacemaker  -JM       MMT (Manual Muscle Testing)    Rt  Lower Ext  Rt Hip Extension;Rt Hip ABduction;Rt Hip ADduction;Rt Hip Flexion;Rt Knee Extension;Rt Knee Flexion;Rt Ankle Dorsiflexion  -JM    Lt Lower Ext  Lt Hip Flexion;Lt Hip Extension;Lt Hip ABduction;Lt Hip ADduction;Lt Knee Flexion;Lt Knee Extension;Lt Ankle Dorsiflexion  -JM       MMT Right Lower Ext    Rt Hip Flexion MMT, Gross Movement  (4/5) good  -JM    Rt Hip Extension MMT, Gross Movement  (3+/5) fair plus  -JM    Rt Hip ABduction MMT, Gross Movement  (4/5) good  -JM    Rt Hip ADduction MMT, Gross Movement  (4+/5) good plus  -JM    Rt Knee Extension MMT, Gross Movement  (5/5) normal  -JM    Rt Knee Flexion MMT, Gross Movement  (4+/5) good plus  -JM    Rt Ankle Dorsiflexion MMT, Gross Movement  (5/5) normal  -JM       MMT Left Lower Ext    Lt Hip Flexion MMT, Gross Movement  (4/5) good  -JM    Lt Hip Extension MMT, Gross Movement  (3+/5) fair plus  -JM    Lt Hip ABduction MMT, Gross Movement  (3+/5) fair plus  -JM    Lt Hip ADduction MMT, Gross Movement  (4/5) good  -JM    Lt Knee Extension MMT, Gross Movement  (4/5) good  -JM    Lt Knee Flexion MMT, Gross Movement  (4/5) good  -JM    Lt Ankle Dorsiflexion MMT, Gross Movement  (4/5) good  -JM      User Key  (r) = Recorded By, (t) = Taken By, (c) = Cosigned By    Initials Name Provider Type    Raleigh Patino, PT DPT Physical Therapist                      PT Assessment/Plan     Row Name 07/17/19 1400 07/17/19 1000       PT Assessment    Functional Limitations  Impaired gait;Decreased safety during functional activities;Performance in leisure activities;Limitation in home management  -  --    Impairments  Muscle strength;Gait;Endurance;Balance  -  --    Assessment Comments  Patient continues to progress well in PT, but still has some strength and balance deficits that PT may further address. Pain in the neck area is now the patient's most significant problem, and will be addressed in PT as well.   -JM  --    Rehab Potential  Good  -  --     Patient/caregiver participated in establishment of treatment plan and goals  Yes  -  --    Patient would benefit from skilled therapy intervention  Yes  -  --       PT Plan    PT Frequency  2x/week  -  --    Predicted Duration of Therapy Intervention (Therapy Eval)  6 weeks  -      Planned CPT's?  PT RE-EVAL: 85232;PT THER ACT EA 15 MIN: 58771;PT NEUROMUSC RE-EDUCATION EA 15 MIN: 55349;PT SELF CARE/HOME MGMT/TRAIN EA 15: 94888;PT ULTRASOUND EA 15 MIN: 30923;PT THER PROC EA 15 MIN: 08088;PT MANUAL THERAPY EA 15 MIN: 01723;PT GAIT TRAINING EA 15 MIN: 93898  -  --    Physical Therapy Interventions (Optional Details)  balance training;gait training;home exercise program;neuromuscular re-education;manual therapy techniques;patient/family education;postural re-education;ROM (Range of Motion);strengthening;stretching;stair training  -  --    PT Plan Comments  Continue progressing LE strengthening and balance. address neck pain as needed. No estim due to pacemaker.   -  --      User Key  (r) = Recorded By, (t) = Taken By, (c) = Cosigned By    Initials Name Provider Type    AB Shen Barrera, OT Occupational Therapist    Raleigh Patino, PT DPT Physical Therapist            Exercises     Row Name 07/17/19 1300             Exercise 2    Exercise Name 2  Pro II level 2  -      Cueing 2  Verbal  -      Time 2  10'  -JM         Exercise 3    Exercise Name 3  LAQ  -      Cueing 3  Verbal  -      Sets 3  2  -JM      Reps 3  15  -JM         Exercise 4    Exercise Name 4  side stepping at table  -      Cueing 4  Verbal  -      Reps 4  10  -JM      Time 4  10 ft ea  -JM         Exercise 5    Exercise Name 5  Sitting marches  -      Cueing 5  Verbal  -      Sets 5  2  -JM      Reps 5  20  -JM         Exercise 6    Exercise Name 6  Recheck, MMT and discussion regarding progress  -      Time 6  15'  -JM         Exercise 7    Exercise Name 7  Balance training with biodex; limits of stability, L and R  leans, static stability  -      Time 7  5' ea, 15' total  -         Exercise 8    Exercise Name 8  sitting heel/toe rocks  -      Cueing 8  Verbal  -      Sets 8  2  -      Reps 8  20  -JM         Exercise 9    Exercise Name 9  gait assessment without use of SPC  -      Reps 9  2  -      Time 9  100 ft  -      Additional Comments  Patient appears to be safe only in closed environments and when not distracted without her cane. PT suggested using can at all times still for safety.   -         Exercise 10    Exercise Name 10  Administration of modified CTSIB  -      Time 10  10'  -JM      Additional Comments  EO firm 1.24     EC firm 2.03    EO foam 1.74     EC foam 3.78   -        User Key  (r) = Recorded By, (t) = Taken By, (c) = Cosigned By    Initials Name Provider Type    Raleigh Patino, PT DPT Physical Therapist                       PT OP Goals     Row Name 07/17/19 1300 07/17/19 0900       PT Short Term Goals    STG Date to Achieve  06/24/19  -     STG 1  Patient will be independent home exercise program  -     STG 1 Progress  Ongoing;Progressing  -     STG 2  Patient to tolerate 45 minutes exercise program  -     STG 2 Progress  Met  -     STG 3  Patient to have hip flexor MMT L 4+  -     STG 3 Progress  Progressing  -     STG 4  Patient have hip abductor MMT Jas Left 4, R 4+  -     STG 4 Progress  Progressing  -     STG 5  Patient have hip ext MMT 3/5  -     STG 5 Progress  Met  -        Long Term Goals    LTG 1  Patient have hamstring MMT 5/5 left  -     LTG 1 Progress  Progressing  -     LTG 2  Pt to not report any further falls  -     LTG 2 Progress  Progressing  -     LTG 3  Patient to achieve composite score on Modified CTSIB of 2.00 or less.   -       User Key  (r) = Recorded By, (t) = Taken By, (c) = Cosigned By    Initials Name Provider Type    Raleigh Patino, PT DPT Physical Therapist               Outcome Measure Options: Other Outcome  Measure  Other Outcome Measure Tool Used  Other Outcome Measure Tool Comments: Modified CTSIB - 2.19 composite score      Time Calculation:   Start Time: 0900  Stop Time: 1010  Time Calculation (min): 70 min  Total Timed Code Minutes- PT: 70 minute(s)  Therapy Charges for Today     Code Description Service Date Service Provider Modifiers Qty    50815234608 HC PT NEUROMUSC RE EDUCATION EA 15 MIN 7/17/2019 Raleigh Parsons, PT DPT GP 2    65356183163 HC PT THERAPEUTIC ACT EA 15 MIN 7/17/2019 Raleigh Parsons, PT DPT GP 2    99171464962 HC PT THER PROC EA 15 MIN 7/17/2019 Raleigh Parsons, PT DPT GP 1          PT G-Codes  Outcome Measure Options: Other Outcome Measure         Raleigh Parsons PT DPT  7/17/2019

## 2019-07-17 NOTE — THERAPY TREATMENT NOTE
Outpatient Occupational Therapy Rehab Program Treatment  [facility]     Patient Name: Laura West  : 1950  MRN: [id number]  Today's Date: 2019        Visit Date: 2019  Visit Number: 88  Recert Date:   % Improvement: TBD  MD visit date: TBD      Total Insurance visits approved: [employer]; 16 V by     Patient Active Problem List   Diagnosis   • Pseudophakia   • Diabetes mellitus without complication (CMS/HCC)        Past Medical History:   Diagnosis Date   • Acute anterior uveitis     OD, improved      • Acute bronchitis    • Acute gouty arthropathy     left 1st MTP joint    • Acute sinusitis    • Artificial lens present     IN POSITION - OU   • Benign neoplasm of skin of eyelid      s/p excision hidrocystoma RLL   • Cellulitis     rt distal arm mild      • Common cold    • Contusion    • Cortical senile cataract     OS   • Cough    • Dysuria    • Gout    • Gouty arthropathy    • Hand pain    • Hyperlipidemia    • Hypertension    • Knee pain    • Posterior subcapsular polar age-related cataract     OS   • Type 2 diabetes with complication (CMS/HCC)    • Vertigo    • Vitreous floaters     asteroid hyalosis OS    • Vitreous opacities     asteroid OS           Past Surgical History:   Procedure Laterality Date   • CATARACT EXTRACTION  2014    Remove cataract, insert lens (Left eye.)   • CATARACT EXTRACTION  2014    Remove cataract, insert lens (Right eye.)   • CATARACT EXTRACTION Bilateral    • EXCISION LESION  2014    EYELID EXCISION LESION 17145 (Benign neoplasm of skin of eyelid)    • INJECTION OF MEDICATION  2012    KENALOG   • OTHER SURGICAL HISTORY  2014    OPTICAL BIOMETRY 79486 (1)            Visit Dx:    ICD-10-CM ICD-9-CM   1. Generalized weakness R53.1 780.79   2. Impaired mobility and ADLs Z74.09 799.89         OT Neuro     Row Name 19 1000             Precautions and Contraindications    Precautions/Limitations  fall precautions  -AB          Subjective Pain    Able to rate subjective pain?  yes  -AB      Pre-Treatment Pain Level  2  -AB      Post-Treatment Pain Level  3  -AB      Subjective Pain Comment  Neck  -AB         Cognitive Assessment/Intervention    Current Cognitive/Communication Assessment  functional  -AB      Orientation Status (Cognition)  oriented x 4  -AB        User Key  (r) = Recorded By, (t) = Taken By, (c) = Cosigned By    Initials Name Provider Type    Shen Murray OT Occupational Therapist                  Therapy Education  Education Details: HEP  Given: HEP  Program: Reinforced  How Provided: Verbal  Provided to: Patient  Level of Understanding: Verbalized    OT Assessment/Plan     Row Name 07/17/19 1400 07/17/19 1000       OT Assessment    Assessment Comments  --  OT tx session tolerated well. Pt demonstrates improvement with FMC in B hands and met STG #2 by completing a FMC activity with 75% accuracy. She also presents with improved tolerance for BUE therapeutic exercise. Pt continues to require moderate rest breaks. Pt presents with impaired BUE strength, FMC, and bilateral coordination.  -AB    OT Rehab Potential  --  Excellent  -AB    Patient/caregiver participated in establishment of treatment plan and goals  --  Yes  -AB    Patient would benefit from skilled therapy intervention  --  Yes  -AB       OT Plan    OT Frequency  --  2x/week  -AB    Predicted Duration of Therapy Intervention (Therapy Eval)  6 weeks  -JM  6 weeks w/further TBD  -AB    OT Plan Comments  --  Progress with skilled OT services through BUE therapeutic exercises to improve BUE strength and activity tolerance for ADLs and IADLs. She would also benefit from therapeutic activities to improve FMC and bilateral coordination to increase independence and participation in ADLs and IADLs.  -AB      User Key  (r) = Recorded By, (t) = Taken By, (c) = Cosigned By    Initials Name Provider Type    Shen Murray OT Occupational Therapist    JM  Raleigh Parsons, PT DPT Physical Therapist           OT Goals     Row Name 07/17/19 1015          OT Short Term Goals    STG Date to Achieve  06/24/19  -AB     STG 1  Pt will report compliance with HEP 3/3 times.  -AB     STG 1 Progress  Met  -AB     STG 2  Pt will complete FMC activity with 75% accuracy and 0-1 drops 2/3 times to improve fine motor control for ADLs and IADLs.  -AB     STG 2 Progress  Met  (Significant)   -AB     STG 2 Progress Comments  Met 3/3 times.  -AB     STG 3  Improve L  strength by 5# average (38#) 2/3 times to improve functional independence in ADLs and IADLs.  -AB     STG 3 Progress  Progressing;Not Met  -AB     STG 4  Pt will complete pinch assessment to determine deficits with pinch strength.  -AB     STG 4 Progress  Met  -AB     STG 5  Pt will engage in 15 min BUE exercises with 3 rest breaks as needed 2/3 times to improve BUE strength and activity tolerance for ADLs and IADLs.  -AB     STG 5 Progress  Progressing  -AB        Long Term Goals    LTG Date to Achieve  07/24/19  -AB     LTG 1  Pt will improve LUE shoulder flexion and extension strength to 4+/5 during MMT 3 times to improve strength and independence for ADLs and IADLs.  -AB     LTG 1 Progress  Progressing  -AB     LTG 2  Pt will improve BUE elbow flexion and extension to 4+/5 during MMT 3 times to improve strength for ADLs and IADLs.  -AB     LTG 2 Progress  Progressing  -AB     LTG 3  Pt will engage in 15 min BUE exercises with 0-1 rest breaks to improve activity tolerance and BUE strength for ADLs and IADLs.  -AB     LTG 3 Progress  Progressing  -AB     LTG 4  Pt will engage in BUE FMC task while standing for 8 min 2/3 times to improve functional independence during ADLs and IADLs.  -AB     LTG 4 Progress  Progressing  -AB     LTG 5  Pt will improve R hand 9 hole peg test to 22.5 seconds 2/3 times to improve FMC and coordination for ADLs and IADLs.  -AB     LTG 5 Progress  Progressing  -AB     LTG 6  Pt will improve L  hand 9 hole peg test to 25 seconds 2/3 times to improve FMC and coordination for ADLs and IADLs.  -AB     LTG 6 Progress  Progressing  -AB     LTG 7  Pt will complete BUE bilateral coordination task with 90% accuracy to improve participation in ADLs and IADLs.  -AB     LTG 7 Progress  Progressing  -AB     LTG 8  Improve L  strength by 10# average (43#) to improve successful engagement in ADLs and IADLs.  -AB     LTG 8 Progress  Progressing  -AB       User Key  (r) = Recorded By, (t) = Taken By, (c) = Cosigned By    Initials Name Provider Type    Shen uMrray OT Occupational Therapist          OT Exercises     Row Name 07/17/19 1000             Exercise 1    Exercise Name 1  3# Dowel Edwardo: pt completed rowboats, abduction exercises to improve BUE strength and activity tolerance.  -AB      Resistance 1  -- inc rest breaks.  -AB      Sets 1  3  -AB      Reps 1  10  -AB      Intensity 1  Moderate  -AB         Exercise 2    Exercise Name 2  2# Dumbells: pt completed bicep curls, supination/pronation, and stir the pots. Mod RBs required.  -AB      Cueing 2  Verbal;Demo  -AB      Sets 2  3  -AB      Reps 2  15  -AB      Intensity 2  Moderate  -AB         Exercise 6    Exercise Name 6  5# Green Digi-Flex: pt completed  and pinch strengthening exercises with B hands for improved hand strength.  -AB      Sets 6  2  -AB      Reps 6  20  -AB      Intensity 6  Moderate  -AB         Exercise 7    Exercise Name 7  Stringing small beads: pt strung 15 beads with B hands.  -AB      Intensity 7  Moderate  -AB        User Key  (r) = Recorded By, (t) = Taken By, (c) = Cosigned By    Initials Name Provider Type    Shen Murray OT Occupational Therapist                      Time Calculation:   OT Start Time: 1015  OT Stop Time: 1102  OT Time Calculation (min): 47 min  Total Timed Code Minutes- OT: 47 minute(s)    Therapy Charges for Today     Code Description Service Date Service Provider Modifiers Qty     67707576176  OT THERAPEUTIC ACT EA 15 MIN 7/17/2019 Shen Barrera, OT GO 2    [id number]  OT THER PROC EA 15 MIN 7/17/2019 Shen Barrera OT GO 1                    Shen Barrera OT  7/17/2019

## 2019-07-22 ENCOUNTER — APPOINTMENT (OUTPATIENT)
Dept: PHYSICAL THERAPY | Facility: HOSPITAL | Age: 69
End: 2019-07-22

## 2019-07-22 ENCOUNTER — APPOINTMENT (OUTPATIENT)
Dept: OCCUPATIONAL THERAPY | Facility: HOSPITAL | Age: 69
End: 2019-07-22

## 2019-07-24 ENCOUNTER — DOCUMENTATION (OUTPATIENT)
Dept: OCCUPATIONAL THERAPY | Facility: HOSPITAL | Age: 69
End: 2019-07-24

## 2019-07-24 ENCOUNTER — APPOINTMENT (OUTPATIENT)
Dept: PHYSICAL THERAPY | Facility: HOSPITAL | Age: 69
End: 2019-07-24

## 2019-07-24 ENCOUNTER — APPOINTMENT (OUTPATIENT)
Dept: OCCUPATIONAL THERAPY | Facility: HOSPITAL | Age: 69
End: 2019-07-24

## 2019-07-24 ENCOUNTER — DOCUMENTATION (OUTPATIENT)
Dept: PHYSICAL THERAPY | Facility: HOSPITAL | Age: 69
End: 2019-07-24

## 2019-07-24 DIAGNOSIS — R53.1 GENERALIZED WEAKNESS: Primary | ICD-10-CM

## 2019-07-24 DIAGNOSIS — Z78.9 IMPAIRED MOBILITY AND ADLS: ICD-10-CM

## 2019-07-24 DIAGNOSIS — Z74.09 IMPAIRED MOBILITY AND ADLS: ICD-10-CM

## 2019-07-24 NOTE — THERAPY DISCHARGE NOTE
Outpatient Occupational Therapy Discharge Summary         Patient Name: Laura West  : 1950  MRN: [id number]    Today's Date: 2019      Visit Date: 2019           OP OT Discharge Summary  Date of Discharge: 19  Reason for Discharge: Patient/Caregiver request(Pt requests to stop therapy; pt states she feels much better.)  Outcomes Achieved: Refer to plan of care for updates on goals achieved(Pt met STG#1 through consistent compliance with HEP and STG#2 by improving FMC accuracy consistently to greater than 75%.)  Discharge Destination: Home with home program  Discharge Instructions: Pt educated during last tx session on HEP and to contact OT with questions or concerns.       Time Calculation: 12 min                        Shen Barrera OT   2019

## 2019-11-01 ENCOUNTER — HOSPITAL ENCOUNTER (EMERGENCY)
Facility: HOSPITAL | Age: 69
Discharge: HOME | End: 2019-11-01
Attending: FAMILY MEDICINE | Admitting: FAMILY MEDICINE

## 2019-11-01 VITALS
OXYGEN SATURATION: 100 % | SYSTOLIC BLOOD PRESSURE: 120 MMHG | DIASTOLIC BLOOD PRESSURE: 75 MMHG | HEART RATE: 65 BPM | RESPIRATION RATE: 18 BRPM | TEMPERATURE: 97.6 F

## 2019-11-01 DIAGNOSIS — A05.9 FOOD CONTAMINATION: Primary | ICD-10-CM

## 2019-11-01 PROCEDURE — 99282 EMERGENCY DEPT VISIT SF MDM: CPT

## 2019-11-01 RX ORDER — ROSUVASTATIN CALCIUM 40 MG/1
40 TABLET, COATED ORAL DAILY
COMMUNITY

## 2019-11-01 RX ORDER — ESCITALOPRAM OXALATE 20 MG/1
20 TABLET ORAL DAILY
COMMUNITY

## 2019-11-01 RX ORDER — ASPIRIN 81 MG/1
81 TABLET, CHEWABLE ORAL DAILY
COMMUNITY

## 2019-11-01 RX ORDER — LORATADINE 10 MG/1
10 CAPSULE, LIQUID FILLED ORAL DAILY
COMMUNITY

## 2019-11-01 RX ORDER — FERROUS SULFATE TAB EC 324 MG (65 MG FE EQUIVALENT) 324 (65 FE) MG
324 TABLET DELAYED RESPONSE ORAL
COMMUNITY

## 2019-11-01 RX ORDER — ONDANSETRON 4 MG/1
4 TABLET, ORALLY DISINTEGRATING ORAL EVERY 6 HOURS PRN
Qty: 10 TABLET | Refills: 0 | Status: SHIPPED | OUTPATIENT
Start: 2019-11-01

## 2019-11-02 NOTE — ED PROVIDER NOTES
Subjective   Patient states that she believes that she ate a bad piece of fish tonight.  Upon chewing the fish, she spit it out.  She has no other complaints.        Illness   Severity:  Mild  Onset quality:  Sudden  Duration:  1 hour  Chronicity:  New  Associated symptoms: no abdominal pain, no chest pain, no congestion, no cough, no diarrhea, no ear pain, no fatigue, no fever, no headaches, no myalgias, no nausea, no rash, no rhinorrhea, no shortness of breath, no sore throat, no vomiting and no wheezing        Review of Systems   Constitutional: Negative for appetite change, chills, diaphoresis, fatigue and fever.   HENT: Negative for congestion, ear discharge, ear pain, nosebleeds, rhinorrhea, sinus pressure, sore throat and trouble swallowing.    Eyes: Negative for discharge and redness.   Respiratory: Negative for apnea, cough, chest tightness, shortness of breath and wheezing.    Cardiovascular: Negative for chest pain.   Gastrointestinal: Negative for abdominal pain, diarrhea, nausea and vomiting.   Endocrine: Negative for polyuria.   Genitourinary: Negative for dysuria, frequency and urgency.   Musculoskeletal: Negative for myalgias and neck pain.   Skin: Negative for color change and rash.   Allergic/Immunologic: Negative for immunocompromised state.   Neurological: Negative for dizziness, seizures, syncope, weakness, light-headedness and headaches.   Hematological: Negative for adenopathy. Does not bruise/bleed easily.   Psychiatric/Behavioral: Negative for behavioral problems and confusion.   All other systems reviewed and are negative.      Past Medical History:   Diagnosis Date   • Acute anterior uveitis     OD, improved      • Acute bronchitis    • Acute gouty arthropathy     left 1st MTP joint    • Acute sinusitis    • Artificial lens present     IN POSITION - OU   • Benign neoplasm of skin of eyelid      s/p excision hidrocystoma RLL   • Cellulitis     rt distal arm mild      • Common cold    •  Contusion    • Cortical senile cataract     OS   • Cough    • Dysuria    • Gout    • Gouty arthropathy    • Hand pain    • Hyperlipidemia    • Hypertension    • Knee pain    • Posterior subcapsular polar age-related cataract     OS   • Type 2 diabetes with complication (CMS/HCC)    • Vertigo    • Vitreous floaters     asteroid hyalosis OS    • Vitreous opacities     asteroid OS          Allergies   Allergen Reactions   • Iodinated Diagnostic Agents      SHAKING   • Tamsulosin Other (See Comments)     Pt stated that she passes out when taking this medication.       Past Surgical History:   Procedure Laterality Date   • CATARACT EXTRACTION  07/24/2014    Remove cataract, insert lens (Left eye.)   • CATARACT EXTRACTION  06/12/2014    Remove cataract, insert lens (Right eye.)   • CATARACT EXTRACTION Bilateral    • EXCISION LESION  07/11/2014    EYELID EXCISION LESION 84798 (Benign neoplasm of skin of eyelid)    • INJECTION OF MEDICATION  03/09/2012    KENALOG   • OTHER SURGICAL HISTORY  07/17/2014    OPTICAL BIOMETRY 09040 (1)          History reviewed. No pertinent family history.    Social History     Socioeconomic History   • Marital status:      Spouse name: Not on file   • Number of children: Not on file   • Years of education: Not on file   • Highest education level: Not on file   Tobacco Use   • Smoking status: Never Smoker   Substance and Sexual Activity   • Alcohol use: No   • Drug use: No   • Sexual activity: Defer           Objective   Physical Exam   Constitutional: She is oriented to person, place, and time. She appears well-developed and well-nourished.   HENT:   Head: Normocephalic and atraumatic.   Nose: Nose normal.   Mouth/Throat: Oropharynx is clear and moist.   Eyes: Conjunctivae and EOM are normal. Pupils are equal, round, and reactive to light. Right eye exhibits no discharge. Left eye exhibits no discharge. No scleral icterus.   Neck: Normal range of motion. Neck supple. No tracheal  deviation present.   Cardiovascular: Normal rate, regular rhythm and normal heart sounds.   No murmur heard.  Pulmonary/Chest: Effort normal and breath sounds normal. No stridor. No respiratory distress. She has no wheezes. She has no rales.   Abdominal: Soft. Bowel sounds are normal. She exhibits no distension and no mass. There is no tenderness. There is no rebound and no guarding.   Musculoskeletal: She exhibits no edema.   Neurological: She is alert and oriented to person, place, and time. Coordination normal.   Skin: Skin is warm and dry. No rash noted. No erythema.   Psychiatric: She has a normal mood and affect. Her behavior is normal. Thought content normal.   Nursing note and vitals reviewed.      Procedures           ED Course          Labs Reviewed - No data to display    No orders to display                 MDM    Final diagnoses:   Food contamination              Conor Barcenas MD  11/01/19 6188

## 2021-04-06 ENCOUNTER — HOSPITAL ENCOUNTER (OUTPATIENT)
Dept: PHYSICAL THERAPY | Facility: HOSPITAL | Age: 71
Setting detail: THERAPIES SERIES
Discharge: HOME | End: 2021-04-06

## 2021-04-06 DIAGNOSIS — M25.552 LEFT HIP PAIN: Primary | ICD-10-CM

## 2021-04-06 PROCEDURE — 97162 PT EVAL MOD COMPLEX 30 MIN: CPT | Performed by: PHYSICAL THERAPIST

## 2021-04-06 NOTE — THERAPY EVALUATION
Outpatient Physical Therapy Ortho Initial Evaluation  [facility]     Patient Name: Laura West  : 1950  MRN: [id number]  Today's Date: 2021      Visit Date: 2021  Visit   Return to MD: MAR  Re-cert date: 21  Patient Active Problem List   Diagnosis   • Pseudophakia   • Diabetes mellitus without complication (CMS/HCC)        Past Medical History:   Diagnosis Date   • Acute anterior uveitis     OD, improved      • Acute bronchitis    • Acute gouty arthropathy     left 1st MTP joint    • Acute sinusitis    • Artificial lens present     IN POSITION - OU   • Benign neoplasm of skin of eyelid      s/p excision hidrocystoma RLL   • Cellulitis     rt distal arm mild      • Common cold    • Contusion    • COPD (chronic obstructive pulmonary disease) (CMS/HCC)    • Cortical senile cataract     OS   • Cough    • Dysuria    • Gout    • Gouty arthropathy    • Hand pain    • Hyperlipidemia    • Hypertension    • Knee pain    • Posterior subcapsular polar age-related cataract     OS   • Type 2 diabetes with complication (CMS/HCC)    • Vertigo    • Vitreous floaters     asteroid hyalosis OS    • Vitreous opacities     asteroid OS           Past Surgical History:   Procedure Laterality Date   • CATARACT EXTRACTION  2014    Remove cataract, insert lens (Left eye.)   • CATARACT EXTRACTION  2014    Remove cataract, insert lens (Right eye.)   • CATARACT EXTRACTION Bilateral    • EXCISION LESION  2014    EYELID EXCISION LESION 32995 (Benign neoplasm of skin of eyelid)    • INJECTION OF MEDICATION  2012    KENALOG   • OTHER SURGICAL HISTORY  2014    OPTICAL BIOMETRY 89289 (1)      • PACEMAKER IMPLANTATION         Visit Dx:     ICD-10-CM ICD-9-CM   1. Left hip pain  M25.552 719.45     Medications (Admitted on 2021)     albuterol (PROVENTIL HFA;VENTOLIN HFA) 108 (90 BASE) MCG/ACT inhaler  allopurinol (ZYLOPRIM) 300 MG tablet  aspirin 325 MG tablet  aspirin 81 MG  chewable tablet  atenolol (TENORMIN) 50 MG tablet  Dietary Management Product (XYZBAC PO)  difluprednate (DUREZOL) 0.05 % ophthalmic emulsion  Ergocalciferol (VITAMIN D2 PO)  escitalopram (LEXAPRO) 20 MG tablet  ezetimibe (ZETIA) 10 MG tablet  ferrous sulfate 324 (65 Fe) MG tablet delayed-release EC tablet  fluticasone (FLONASE) 50 MCG/ACT nasal spray  furosemide (LASIX) 20 MG tablet  HYDROcodone-acetaminophen (NORCO) 5-325 MG per tablet  hydrocortisone 0.5 % cream  lisinopril (PRINIVIL,ZESTRIL) 10 MG tablet  Loratadine 10 MG capsule  magnesium oxide (MAGOX) 400 (241.3 Mg) MG tablet tablet  Menthol, Topical Analgesic, 4 % gel  metoclopramide (REGLAN) 5 MG tablet  Nepafenac (ILEVRO) 0.3 % suspension  NITROSTAT 0.4 MG SL tablet  NYSTATIN PO  ondansetron ODT (ZOFRAN-ODT) 4 MG disintegrating tablet  pantoprazole (PROTONIX) 40 MG EC tablet  polyethyl glycol-propyl glycol (SYSTANE) 0.4-0.3 % solution ophthalmic solution  polyethylene glycol (MIRALAX) powder  potassium chloride (K-DUR) 10 MEQ CR tablet  rosuvastatin (CRESTOR) 40 MG tablet  venlafaxine XR (EFFEXOR-XR) 150 MG 24 hr capsule  verapamil (CALAN) 40 MG tablet     Allergies      Iodinated Diagnostic Agents   TamsulosinOther (See Comments)  Tal as Reviewed      PT Ortho     Row Name 04/06/21 0900       Subjective Comments    Subjective Comments  70 yo female with a fall in Jan 2021 and onset of LBP and L hip pain in Feb 2021. H/o frequent falls over the past few years. Has a h/o a balance deficit as well as occasional syncope (h/o pacemaker as of 2016). Used a walker for stability with ambulation. Has had 4 falls over the past 2 years.    -BS       Precautions and Contraindications    Precautions/Limitations  fall precautions  -BS    Precautions  pacemaker-no modalities  -BS       Subjective Pain    Able to rate subjective pain?  yes  -BS    Pre-Treatment Pain Level  6  -BS    Post-Treatment Pain Level  4  -BS    Subjective Pain Comment  L hip pain/LBP  -BS        Posture/Observations    Posture/Observations Comments  morbidly obese   -BS       Special Tests/Palpation    Special Tests/Palpation  Hip  -BS       Hip Special Tests    SILVANA (hip vs SI pathology)  Right:;Positive;Left:;Negative  -BS       General ROM    GENERAL ROM COMMENTS  AROM: R hip flex 105° L hip flex 80° R hip ER/IR WNL L hip ER/IR min limit/severe limit R knee 0-120° L knee 0-100°  -BS       MMT (Manual Muscle Testing)    General MMT Comments  B LE MMT 5/5 except R hip abd 3+/5 L hip abd 3-/5   -BS       Sensation    Light Touch  Partial deficits in the LLE paresthesia with L thigh and pain to light touch L foot  -BS       Balance Skills Training    Balance Comments  Tinetti balance test-16/28 (high fall risk < or =18 points)  -BS      User Key  (r) = Recorded By, (t) = Taken By, (c) = Cosigned By    Initials Name Provider Type    Terrance Gates, PT Physical Therapist                            PT OP Goals     Row Name 04/06/21 0900          PT Short Term Goals    STG Date to Achieve  04/27/21  -BS     STG 1  Pt independent with HEP for left hip strengthening and ROM  -BS     STG 1 Progress  New  -BS     STG 2  Improve L hip abduction MMT to 4/5 or better  -BS     STG 2 Progress  New  -BS     STG 3  Improve L hip flex AROM to 95°  -BS     STG 3 Progress  New  -BS     STG 4  Improve Tinetti balance test score to >/= 20/28 to reduce pt's fall risk  -BS     STG 4 Progress  New  -BS        Time Calculation    PT Goal Re-Cert Due Date  04/27/21  -BS       User Key  (r) = Recorded By, (t) = Taken By, (c) = Cosigned By    Initials Name Provider Type    Terrance Gates, PT Physical Therapist          PT Assessment/Plan     Row Name 04/06/21 0857          PT Assessment    Functional Limitations  Impaired gait;Performance in work activities;Performance in leisure activities;Limitation in home management  -BS     Impairments  Balance;Gait;Impaired flexibility;Pain;Sensation;Range of motion;Muscle strength  -BS      Assessment Comments  70 yo female with acute onset L hip pain and LBP with a concurrent balance deficit. Presents with a high fall risk per Tinetti balance test finding (16/28). Ambulates with a hurrycane, presents with L hip pain and LBP, as well as L hip ROM loss and strength deficits. Would benefit from skilled PT to address L hip strengthening, stretches to improve L hip ROM and reduce L hip pain and balance training to reduce pt's fal risk.    -BS     Please refer to paper survey for additional self-reported information  No see Tinetti balance test findings  -BS     Rehab Potential  Good  -BS     Patient/caregiver participated in establishment of treatment plan and goals  Yes  -BS     Patient would benefit from skilled therapy intervention  Yes  -BS        PT Plan    PT Frequency  2x/week  -BS     Predicted Duration of Therapy Intervention (PT)  3 weeks then TBD  -BS     Planned CPT's?  PT EVAL MOD COMPLELITY: 20658;PT RE-EVAL: 27842;PT THER PROC EA 15 MIN: 21025;PT THER ACT EA 15 MIN: 42351;PT MANUAL THERAPY EA 15 MIN: 40014;PT NEUROMUSC RE-EDUCATION EA 15 MIN: 14210;PT GAIT TRAINING EA 15 MIN: 18972;PT HOT OR COLD PACK TREAT MCARE;PT ELECTRICAL STIM UNATTEND: ;PT THER SUPP EA 15 MIN  -BS     Physical Therapy Interventions (Optional Details)  balance training;aquatics exercise;gait training;home exercise program;lumbar stabilization;manual therapy techniques;modalities;neuromuscular re-education;patient/family education;ROM (Range of Motion);strengthening;stretching;stair training;transfer training  -BS     PT Plan Comments  Address L hip strengthening and balance training. Eval only. Awaiting MD orders.   -BS       User Key  (r) = Recorded By, (t) = Taken By, (c) = Cosigned By    Initials Name Provider Type    Terrance Gates, PT Physical Therapist            OP Exercises     Row Name 04/06/21 0900             Subjective Comments    Subjective Comments  70 yo female with a fall in Jan 2021 and onset  "of LBP and L hip pain in Feb 2021. H/o frequent falls over the past few years. Has a h/o a balance deficit as well as occasional syncope (h/o pacemaker as of 2016). Used a walker for stability with ambulation. Has had 4 falls over the past 2 years.    -BS         Subjective Pain    Able to rate subjective pain?  yes  -BS      Pre-Treatment Pain Level  6  -BS      Post-Treatment Pain Level  4  -BS      Subjective Pain Comment  L hip pain/LBP  -BS        User Key  (r) = Recorded By, (t) = Taken By, (c) = Cosigned By    Initials Name Provider Type    Terrance Gates, PT Physical Therapist                        Outcome Measure Options: Tinetti  Tinetti Assessment  Sitting Balance: Steady,safe  Arises: Able, uses arms  Attempts to Rise: Able in 1 attempt  Immediate Standing Balance (first 5 sec): Unsteady (sway/stagger/feet move)  Standing Balance: Narrow stance, without support  Sternal Nudge (feet close together): Steady  Eyes Closed (feet close together): Unsteady  Turning 360 Degrees- Steps: Discontinuous steps  Turning 360 Degrees- Steadiness: Unsteady (staggers, grabs)  Sitting Down: Uses arms or not a smooth motion  Tinetti Balance Score: 9  Gait Initiation (immediate after told \"go\"): Any hesitancy, multiple attempts to start  Step Length- Right Swing: Right swing foot passes Left stance leg  Step Length- Left Swing: Left swing foot passes Right  Foot Clearance- Right Foot: Right foot completely clears floor  Foot Clearance- Left Foot: Left foot completely clears floor  Step Symmetry: Right and Left step length equal  Step Continuity: Steps appear continuous  Path (excursion): Marked deviation  Trunk: Marked sway or uses device  Base of Support: Heels close while walking  Gait Score: 7  Tinetti Total Score: 16  Tinetti Assistive Device: straight cane  Tinetti Assessment Comments: using Fididel      Time Calculation:     Start Time: 0857  Stop Time: 0939  Time Calculation (min): 42 min  Total Timed Code " Minutes- PT: 42 minute(s)     Therapy Charges for Today     Code Description Service Date Service Provider Modifiers Qty    12943868195 HC PT EVAL MOD COMPLEXITY 3 4/6/2021 Terrance Phelps, PT GP 1          PT G-Codes  Outcome Measure Options: Tinetti  Tinetti Total Score: 16         Terrance Phelps, PT  4/6/2021

## 2021-04-15 ENCOUNTER — TRANSCRIBE ORDERS (OUTPATIENT)
Dept: PHYSICAL THERAPY | Facility: HOSPITAL | Age: 71
End: 2021-04-15

## 2021-04-15 DIAGNOSIS — M54.9 BACK PAIN, UNSPECIFIED BACK LOCATION, UNSPECIFIED BACK PAIN LATERALITY, UNSPECIFIED CHRONICITY: Primary | ICD-10-CM

## 2021-04-19 ENCOUNTER — APPOINTMENT (OUTPATIENT)
Dept: PHYSICAL THERAPY | Facility: HOSPITAL | Age: 71
End: 2021-04-19

## 2021-04-20 ENCOUNTER — HOSPITAL ENCOUNTER (OUTPATIENT)
Dept: PHYSICAL THERAPY | Facility: HOSPITAL | Age: 71
Setting detail: THERAPIES SERIES
Discharge: HOME | End: 2021-04-20

## 2021-04-20 DIAGNOSIS — M25.552 LEFT HIP PAIN: Primary | ICD-10-CM

## 2021-04-20 DIAGNOSIS — R29.6 FREQUENT FALLS: ICD-10-CM

## 2021-04-20 PROCEDURE — 97110 THERAPEUTIC EXERCISES: CPT

## 2021-04-20 NOTE — THERAPY TREATMENT NOTE
Outpatient Physical Therapy Ortho Treatment Note  [facility]     Patient Name: Laura West  : 1950  MRN: [id number]  Today's Date: 2021      Visit Date: 2021      Subjective Improvement: 0%  Attendance:  2/2 (Eval + 6 -)  Next MD Visit : TBD  Recert Date:  2021      Therapy Diagnosis:  L Hip/LBP Pain        Visit Dx:    ICD-10-CM ICD-9-CM   1. Left hip pain  M25.552 719.45   2. Frequent falls  R29.6 V15.88       Patient Active Problem List   Diagnosis   • Pseudophakia   • Diabetes mellitus without complication (CMS/HCC)        Past Medical History:   Diagnosis Date   • Acute anterior uveitis     OD, improved      • Acute bronchitis    • Acute gouty arthropathy     left 1st MTP joint    • Acute sinusitis    • Artificial lens present     IN POSITION - OU   • Benign neoplasm of skin of eyelid      s/p excision hidrocystoma RLL   • Cellulitis     rt distal arm mild      • Common cold    • Contusion    • COPD (chronic obstructive pulmonary disease) (CMS/HCC)    • Cortical senile cataract     OS   • Cough    • Dysuria    • Gout    • Gouty arthropathy    • Hand pain    • Hyperlipidemia    • Hypertension    • Knee pain    • Posterior subcapsular polar age-related cataract     OS   • Type 2 diabetes with complication (CMS/HCC)    • Vertigo    • Vitreous floaters     asteroid hyalosis OS    • Vitreous opacities     asteroid OS           Past Surgical History:   Procedure Laterality Date   • CATARACT EXTRACTION  2014    Remove cataract, insert lens (Left eye.)   • CATARACT EXTRACTION  2014    Remove cataract, insert lens (Right eye.)   • CATARACT EXTRACTION Bilateral    • EXCISION LESION  2014    EYELID EXCISION LESION 73043 (Benign neoplasm of skin of eyelid)    • INJECTION OF MEDICATION  2012    KENALOG   • OTHER SURGICAL HISTORY  2014    OPTICAL BIOMETRY 37517 (1)      • PACEMAKER IMPLANTATION         PT Ortho     Row Name 21 1200        "Precautions and Contraindications    Precautions/Limitations  fall precautions  -    Precautions  pacemaker-no modalities  -       Posture/Observations    Posture/Observations Comments  gait with hurrycane; Trendlenberg noted; Tightness noted in L lumbar paraspinals  -      User Key  (r) = Recorded By, (t) = Taken By, (c) = Cosigned By    Initials Name Provider Type    Graciela Polanco PTA Physical Therapy Assistant                      PT Assessment/Plan     Row Name 04/20/21 1200          PT Assessment    Assessment Comments  patient given written illustraion for new HEP; Helped with patients pain;   -        PT Plan    PT Frequency  2x/week  -JOAN     Predicted Duration of Therapy Intervention (PT)  3 weeks then TBD  -     PT Plan Comments  STM to L hip/lumbar paraspinals.   -       User Key  (r) = Recorded By, (t) = Taken By, (c) = Cosigned By    Initials Name Provider Type    Graciela Polanco PTA Physical Therapy Assistant            OP Exercises     Row Name 04/20/21 1200             Subjective Comments    Subjective Comments  Patient reports that she is moving slow today; Has fallen once since last visit; sat down in her swing and when she went backwards with the swing. All on the left side;   -KH         Subjective Pain    Able to rate subjective pain?  yes  -KH      Pre-Treatment Pain Level  8  -KH      Post-Treatment Pain Level  4  -KH         Exercise 1    Exercise Name 1  add squeeze  -KH      Cueing 1  Verbal  -KH      Sets 1  1  -KH      Reps 1  10  -KH      Time 1  3\" holds  -KH      Additional Comments  hooklying w/ iso trans abs  -KH         Exercise 2    Exercise Name 2  hooklying hip flexion  -KH      Cueing 2  Verbal  -KH      Sets 2  1  -KH      Reps 2  10 each B  -KH         Exercise 3    Exercise Name 3  LTR  -KH      Cueing 3  Verbal;Demo  -KH      Sets 3  1  -KH      Reps 3  10  -KH      Time 3  10\" hold  -KH         Exercise 4    Exercise Name 4  Bridge  -KH      Cueing 4  Verbal  " -      Sets 4  1  -      Reps 4  10  -KH         Exercise 5    Exercise Name 5  clam shells B  -KH      Cueing 5  Verbal  -      Sets 5  1  -KH      Reps 5  10 each   -        User Key  (r) = Recorded By, (t) = Taken By, (c) = Cosigned By    Initials Name Provider Type    Graciela Polanco PTA Physical Therapy Assistant                       PT OP Goals     Row Name 04/20/21 1200          PT Short Term Goals    STG Date to Achieve  04/27/21  -     STG 1  Pt independent with HEP for left hip strengthening and ROM  -     STG 1 Progress  Progressing  -     STG 2  Improve L hip abduction MMT to 4/5 or better  -     STG 2 Progress  Progressing  -     STG 3  Improve L hip flex AROM to 95°  -     STG 3 Progress  Progressing  -     STG 4  Improve Tinetti balance test score to >/= 20/28 to reduce pt's fall risk  -     STG 4 Progress  Progressing  -        Time Calculation    PT Goal Re-Cert Due Date  04/27/21  -       User Key  (r) = Recorded By, (t) = Taken By, (c) = Cosigned By    Initials Name Provider Type    Graciela Polanco PTA Physical Therapy Assistant          Therapy Education  Education Details: hook lying add squeeze, alt hip flexion, LTR, bridges, clam shells   Given: HEP  Program: New  How Provided: Verbal, Demonstration, Written  Provided to: Patient  Level of Understanding: Teach back education performed, Verbalized, Demonstrated              Time Calculation:   Start Time: 1200  Stop Time: 1230  Time Calculation (min): 30 min  Therapy Charges for Today     Code Description Service Date Service Provider Modifiers Qty    31149087378 HC PT THER PROC EA 15 MIN 4/20/2021 Graciela Cueva PTA GP 2                    Graciela Cueva PTA  4/20/2021

## 2021-04-21 ENCOUNTER — HOSPITAL ENCOUNTER (OUTPATIENT)
Dept: PHYSICAL THERAPY | Facility: HOSPITAL | Age: 71
Setting detail: THERAPIES SERIES
Discharge: HOME | End: 2021-04-21

## 2021-04-21 DIAGNOSIS — R29.6 FREQUENT FALLS: ICD-10-CM

## 2021-04-21 DIAGNOSIS — R53.1 GENERALIZED WEAKNESS: ICD-10-CM

## 2021-04-21 DIAGNOSIS — M25.552 LEFT HIP PAIN: Primary | ICD-10-CM

## 2021-04-21 PROCEDURE — 97110 THERAPEUTIC EXERCISES: CPT

## 2021-04-21 PROCEDURE — 97140 MANUAL THERAPY 1/> REGIONS: CPT

## 2021-04-21 PROCEDURE — 97530 THERAPEUTIC ACTIVITIES: CPT

## 2021-04-21 NOTE — THERAPY TREATMENT NOTE
Outpatient Physical Therapy Ortho Treatment Note  [facility]     Patient Name: Laura West  : 1950  MRN: [id number]  Today's Date: 2021      Visit Date: 2021     Subjective Improvement: 0%  Attendance:  3/3 (Eval + 6 -)  Next MD Visit : TBD  Recert Date:  2021        Therapy Diagnosis:  L Hip/LBP Pain       Visit Dx:    ICD-10-CM ICD-9-CM   1. Left hip pain  M25.552 719.45   2. Frequent falls  R29.6 V15.88   3. Generalized weakness  R53.1 780.79       Patient Active Problem List   Diagnosis   • Pseudophakia   • Diabetes mellitus without complication (CMS/HCC)        Past Medical History:   Diagnosis Date   • Acute anterior uveitis     OD, improved      • Acute bronchitis    • Acute gouty arthropathy     left 1st MTP joint    • Acute sinusitis    • Artificial lens present     IN POSITION - OU   • Benign neoplasm of skin of eyelid      s/p excision hidrocystoma RLL   • Cellulitis     rt distal arm mild      • Common cold    • Contusion    • COPD (chronic obstructive pulmonary disease) (CMS/HCC)    • Cortical senile cataract     OS   • Cough    • Dysuria    • Gout    • Gouty arthropathy    • Hand pain    • Hyperlipidemia    • Hypertension    • Knee pain    • Posterior subcapsular polar age-related cataract     OS   • Type 2 diabetes with complication (CMS/HCC)    • Vertigo    • Vitreous floaters     asteroid hyalosis OS    • Vitreous opacities     asteroid OS           Past Surgical History:   Procedure Laterality Date   • CATARACT EXTRACTION  2014    Remove cataract, insert lens (Left eye.)   • CATARACT EXTRACTION  2014    Remove cataract, insert lens (Right eye.)   • CATARACT EXTRACTION Bilateral    • EXCISION LESION  2014    EYELID EXCISION LESION 38392 (Benign neoplasm of skin of eyelid)    • INJECTION OF MEDICATION  2012    KENALOG   • OTHER SURGICAL HISTORY  2014    OPTICAL BIOMETRY 39905 (1)      • PACEMAKER IMPLANTATION         PT  Ortho     Row Name 04/21/21 1100       Precautions and Contraindications    Precautions/Limitations  fall precautions  -KH    Precautions  pacemaker-no modalities  -       Posture/Observations    Posture/Observations Comments  gait with hurrycane; Trendlenberg noted; Tightness noted in L lumbar paraspinals' TTP L GT  -      User Key  (r) = Recorded By, (t) = Taken By, (c) = Cosigned By    Initials Name Provider Type    Graciela Polanco PTA Physical Therapy Assistant                      PT Assessment/Plan     Row Name 04/21/21 1100          PT Assessment    Assessment Comments  responded well to manual and had decreased pain; Balance challgeneged with feet together but no LOB  -        PT Plan    PT Frequency  2x/week  -KH     Predicted Duration of Therapy Intervention (PT)  3 weeks then TBD  -     PT Plan Comments  Continue with manual as benefical; Core strength and progress balance;   -       User Key  (r) = Recorded By, (t) = Taken By, (c) = Cosigned By    Initials Name Provider Type    Graciela Polanco PTA Physical Therapy Assistant          Modalities     Row Name 04/21/21 1100             Subjective Pain    Post-Treatment Pain Level  4  -        User Key  (r) = Recorded By, (t) = Taken By, (c) = Cosigned By    Initials Name Provider Type    Graciela Polanco PTA Physical Therapy Assistant        OP Exercises     Row Name 04/21/21 1100             Subjective Comments    Subjective Comments  patient reports that she had decreased pain and able to do HEp at home without difficulty. LTR are painful but able to do  -KH         Subjective Pain    Able to rate subjective pain?  yes  -KH      Pre-Treatment Pain Level  7  -KH      Post-Treatment Pain Level  4  -KH         Exercise 1    Exercise Name 1  Hooklying Alt LE lifts  -KH      Cueing 1  Verbal  -KH      Sets 1  2  -KH      Reps 1  10  -KH         Exercise 2    Exercise Name 2  STM to L proximal ITB/GT/Lumbar paraspinals and piriformis  -      Time 2   "15 mins  -KH         Exercise 3    Exercise Name 3  LAQ B  -KH      Cueing 3  Verbal  -KH      Sets 3  2  -KH      Reps 3  10  -KH         Exercise 4    Exercise Name 4  seated CR/TR  -KH      Cueing 4  Verbal  -KH      Sets 4  2  -KH      Reps 4  10 each   -KH         Exercise 5    Exercise Name 5  Balance: FA EO  -KH      Sets 5  3  -KH      Time 5  30\" holds  -KH         Exercise 6    Exercise Name 6  Balance: FT/EO  -KH      Sets 6  3  -KH      Time 6  30\" holds  -KH        User Key  (r) = Recorded By, (t) = Taken By, (c) = Cosigned By    Initials Name Provider Type    Graciela Polanco PTA Physical Therapy Assistant                       PT OP Goals     Row Name 04/21/21 1100          PT Short Term Goals    STG Date to Achieve  04/27/21  -     STG 1  Pt independent with HEP for left hip strengthening and ROM  -     STG 1 Progress  Progressing  -KH     STG 2  Improve L hip abduction MMT to 4/5 or better  -     STG 2 Progress  Progressing  -KH     STG 3  Improve L hip flex AROM to 95°  -KH     STG 3 Progress  Progressing  -KH     STG 4  Improve Tinetti balance test score to >/= 20/28 to reduce pt's fall risk  -     STG 4 Progress  Progressing  -KH        Time Calculation    PT Goal Re-Cert Due Date  04/27/21  -       User Key  (r) = Recorded By, (t) = Taken By, (c) = Cosigned By    Initials Name Provider Type    Graciela Polanco PTA Physical Therapy Assistant                         Time Calculation:   Start Time: 1112  Stop Time: 1152  Time Calculation (min): 40 min  Therapy Charges for Today     Code Description Service Date Service Provider Modifiers Qty    53255207050 HC PT MANUAL THERAPY EA 15 MIN 4/21/2021 Graciela Cueva, FREDDY GP 1    13217001160 HC PT THER PROC EA 15 MIN 4/21/2021 Graciela Cueva PTA GP 1    18318255788 HC PT THERAPEUTIC ACT EA 15 MIN 4/21/2021 Graciela Cueva PTA GP 1                    Graciela Cueva PTA  4/21/2021     "

## 2021-04-27 ENCOUNTER — HOSPITAL ENCOUNTER (OUTPATIENT)
Dept: PHYSICAL THERAPY | Facility: HOSPITAL | Age: 71
Setting detail: THERAPIES SERIES
Discharge: HOME | End: 2021-04-27

## 2021-04-27 DIAGNOSIS — M25.552 LEFT HIP PAIN: Primary | ICD-10-CM

## 2021-04-27 PROCEDURE — 97140 MANUAL THERAPY 1/> REGIONS: CPT

## 2021-04-27 PROCEDURE — 97110 THERAPEUTIC EXERCISES: CPT

## 2021-04-27 NOTE — THERAPY TREATMENT NOTE
Outpatient Physical Therapy Ortho Treatment Note  [facility]     Patient Name: Laura West  : 1950  MRN: [id number]  Today's Date: 2021      Visit Date: 2021     Subjective Improvement: 0%  Attendance:   (Eval + 6 -)  Next MD Visit : TBD  Recert Date:  2021        Therapy Diagnosis:  L Hip/LBP Pain    Visit Dx:    ICD-10-CM ICD-9-CM   1. Left hip pain  M25.552 719.45       Patient Active Problem List   Diagnosis   • Pseudophakia   • Diabetes mellitus without complication (CMS/HCC)        Past Medical History:   Diagnosis Date   • Acute anterior uveitis     OD, improved      • Acute bronchitis    • Acute gouty arthropathy     left 1st MTP joint    • Acute sinusitis    • Artificial lens present     IN POSITION - OU   • Benign neoplasm of skin of eyelid      s/p excision hidrocystoma RLL   • Cellulitis     rt distal arm mild      • Common cold    • Contusion    • COPD (chronic obstructive pulmonary disease) (CMS/HCC)    • Cortical senile cataract     OS   • Cough    • Dysuria    • Gout    • Gouty arthropathy    • Hand pain    • Hyperlipidemia    • Hypertension    • Knee pain    • Posterior subcapsular polar age-related cataract     OS   • Type 2 diabetes with complication (CMS/HCC)    • Vertigo    • Vitreous floaters     asteroid hyalosis OS    • Vitreous opacities     asteroid OS           Past Surgical History:   Procedure Laterality Date   • CATARACT EXTRACTION  2014    Remove cataract, insert lens (Left eye.)   • CATARACT EXTRACTION  2014    Remove cataract, insert lens (Right eye.)   • CATARACT EXTRACTION Bilateral    • EXCISION LESION  2014    EYELID EXCISION LESION 80543 (Benign neoplasm of skin of eyelid)    • INJECTION OF MEDICATION  2012    KENALOG   • OTHER SURGICAL HISTORY  2014    OPTICAL BIOMETRY 88132 (1)      • PACEMAKER IMPLANTATION         PT Ortho     Row Name 21 1300       Precautions and Contraindications     Precautions/Limitations  fall precautions  -    Precautions  pacemaker-no modalities  -       Posture/Observations    Posture/Observations Comments  gait with hurrycane; fwd flex at trunk; improved trendlenberg this date  -      User Key  (r) = Recorded By, (t) = Taken By, (c) = Cosigned By    Initials Name Provider Type    Graciela Polanco PTA Physical Therapy Assistant                      PT Assessment/Plan     Row Name 04/27/21 1300          PT Assessment    Assessment Comments  continues to respond well to manual; has still tenderness noted in the L ITB and piriformis on L; no change in HEP this date; seems to be progressing well;   -        PT Plan    PT Frequency  2x/week  -     Predicted Duration of Therapy Intervention (PT)  3 weeks then TBD   -     PT Plan Comments  Continue with manual and core strength as able; Progress standing exercises next  -       User Key  (r) = Recorded By, (t) = Taken By, (c) = Cosigned By    Initials Name Provider Type    Graciela Polanco PTA Physical Therapy Assistant            OP Exercises     Row Name 04/27/21 1300             Subjective Comments    Subjective Comments  Patient reports that when she left last visit she didn't have any pain and she really hasn't been hurting bad since; Reports compliance with HEP and states that she still faitgues with it but she is working;   -         Subjective Pain    Able to rate subjective pain?  yes  -      Pre-Treatment Pain Level  4  -      Post-Treatment Pain Level  3  -         Total Minutes    56602 - PT Therapeutic Exercise Minutes  23  -KH      50798 - PT Manual Therapy Minutes  15  -KH         Exercise 1    Exercise Name 1  pro ll LE/UE ROM/Strength   -      Cueing 1  Verbal;Demo  -      Time 1  8 mins  -      Additional Comments  level 1; seat 12  -         Exercise 2    Exercise Name 2  STM to L proximal ITB/GT/Lumbar paraspinals and piriformis  -      Time 2  15 mins  -         Exercise 3     Exercise Name 3  standing CR/TR  -KH      Cueing 3  Verbal  -KH      Sets 3  2  -KH      Reps 3  10  -KH         Exercise 4    Exercise Name 4  standing hip abd B  -KH      Cueing 4  Verbal  -KH      Sets 4  1  -KH      Reps 4  15 each   -KH         Exercise 5    Exercise Name 5  standing hip flexion (march)  -KH      Cueing 5  Verbal  -KH      Sets 5  1  -KH      Reps 5  15 each  -KH         Exercise 6    Exercise Name 6  standing hip ext B  -KH      Cueing 6  Verbal  -KH      Sets 6  1  -KH      Reps 6  15 each   -KH        User Key  (r) = Recorded By, (t) = Taken By, (c) = Cosigned By    Initials Name Provider Type    Graciela Polanco PTA Physical Therapy Assistant                      Manual Rx (last 36 hours)      Manual Treatments     Row Name 04/27/21 1300             Total Minutes    78740 - PT Manual Therapy Minutes  15  -KH         Manual Rx 1    Manual Rx 1 Location  L ITB proximal & piriformis, lumbar paraspinals  -      Manual Rx 1 Type  MFR/STM  -        User Key  (r) = Recorded By, (t) = Taken By, (c) = Cosigned By    Initials Name Provider Type    Graciela Polanco PTA Physical Therapy Assistant          PT OP Goals     Row Name 04/27/21 1300          PT Short Term Goals    STG Date to Achieve  04/27/21  -     STG 1  Pt independent with HEP for left hip strengthening and ROM  -     STG 1 Progress  Progressing  -     STG 2  Improve L hip abduction MMT to 4/5 or better  -     STG 2 Progress  Progressing  -     STG 3  Improve L hip flex AROM to 95°  -     STG 3 Progress  Progressing  -     STG 4  Improve Tinetti balance test score to >/= 20/28 to reduce pt's fall risk  -     STG 4 Progress  Progressing  -        Time Calculation    PT Goal Re-Cert Due Date  04/27/21  -       User Key  (r) = Recorded By, (t) = Taken By, (c) = Cosigned By    Initials Name Provider Type    Graciela Polanco PTA Physical Therapy Assistant                         Time Calculation:   Start Time: 1354  Stop  Time: 1432  Time Calculation (min): 38 min  Time Calculation- PT  Start Time: 1354  Stop Time: 1432  Time Calculation (min): 38 min  PT Goal Re-Cert Due Date: 04/27/21  Timed Charges  26262 - PT Therapeutic Exercise Minutes: 23  48724 - PT Manual Therapy Minutes: 15  Total Minutes  Timed Charges Total Minutes: 38   Total Minutes: 38  Therapy Charges for Today     Code Description Service Date Service Provider Modifiers Qty    87545621509 HC PT THER PROC EA 15 MIN 4/27/2021 Graciela Cueva PTA GP 2    06556206316 HC PT MANUAL THERAPY EA 15 MIN 4/27/2021 Graciela Cueva PTA GP 1                    Graciela Cueva PTA  4/27/2021

## 2021-04-29 ENCOUNTER — HOSPITAL ENCOUNTER (OUTPATIENT)
Dept: PHYSICAL THERAPY | Facility: HOSPITAL | Age: 71
Setting detail: THERAPIES SERIES
Discharge: HOME | End: 2021-04-29

## 2021-04-29 DIAGNOSIS — M25.552 LEFT HIP PAIN: Primary | ICD-10-CM

## 2021-04-29 PROCEDURE — 97530 THERAPEUTIC ACTIVITIES: CPT

## 2021-04-29 PROCEDURE — 97110 THERAPEUTIC EXERCISES: CPT

## 2021-04-29 PROCEDURE — 97140 MANUAL THERAPY 1/> REGIONS: CPT

## 2021-04-29 NOTE — THERAPY TREATMENT NOTE
Outpatient Physical Therapy Ortho Treatment Note  [facility]     Patient Name: Laura West  : 1950  MRN: [id number]  Today's Date: 2021      Visit Date: 2021      Subjective Improvement: 70%  Attendance:  / (Eval + 6 -)  Next MD Visit : TBD  Recert Date:  2021        Therapy Diagnosis:  L Hip/LBP Pain    Visit Dx:    ICD-10-CM ICD-9-CM   1. Left hip pain  M25.552 719.45       Patient Active Problem List   Diagnosis   • Pseudophakia   • Diabetes mellitus without complication (CMS/HCC)        Past Medical History:   Diagnosis Date   • Acute anterior uveitis     OD, improved      • Acute bronchitis    • Acute gouty arthropathy     left 1st MTP joint    • Acute sinusitis    • Artificial lens present     IN POSITION - OU   • Benign neoplasm of skin of eyelid      s/p excision hidrocystoma RLL   • Cellulitis     rt distal arm mild      • Common cold    • Contusion    • COPD (chronic obstructive pulmonary disease) (CMS/HCC)    • Cortical senile cataract     OS   • Cough    • Dysuria    • Gout    • Gouty arthropathy    • Hand pain    • Hyperlipidemia    • Hypertension    • Knee pain    • Posterior subcapsular polar age-related cataract     OS   • Type 2 diabetes with complication (CMS/HCC)    • Vertigo    • Vitreous floaters     asteroid hyalosis OS    • Vitreous opacities     asteroid OS           Past Surgical History:   Procedure Laterality Date   • CATARACT EXTRACTION  2014    Remove cataract, insert lens (Left eye.)   • CATARACT EXTRACTION  2014    Remove cataract, insert lens (Right eye.)   • CATARACT EXTRACTION Bilateral    • EXCISION LESION  2014    EYELID EXCISION LESION 68861 (Benign neoplasm of skin of eyelid)    • INJECTION OF MEDICATION  2012    KENALOG   • OTHER SURGICAL HISTORY  2014    OPTICAL BIOMETRY 10248 (1)      • PACEMAKER IMPLANTATION         PT Ortho     Row Name 21 1100       Precautions and Contraindications     Precautions/Limitations  fall precautions  -    Precautions  pacemaker-no modalities  -       Subjective Pain    Post-Treatment Pain Level  3  -       Posture/Observations    Posture/Observations Comments  gait with hurrycane; fwd flex at trunk; improved trendlenberg this date  -      User Key  (r) = Recorded By, (t) = Taken By, (c) = Cosigned By    Initials Name Provider Type    Graciela Polanco PTA Physical Therapy Assistant                      PT Assessment/Plan     Row Name 04/29/21 1100          PT Assessment    Assessment Comments  continues to respond well to manual and exercises; Balance retraining done for theraputic activites to help assist with patient moving around inside her home;   -        PT Plan    PT Frequency  2x/week  -     Predicted Duration of Therapy Intervention (PT)  3 weeks then TBD  -     PT Plan Comments  Recheck next visit; Progress as advised; Continue with balance and cores strength as able. Will need more visits approved after next visit if continued PT is needed;   -       User Key  (r) = Recorded By, (t) = Taken By, (c) = Cosigned By    Initials Name Provider Type    Graciela Polanco PTA Physical Therapy Assistant            OP Exercises     Row Name 04/29/21 1100             Subjective Comments    Subjective Comments  Had pain after the last treatment that caused her medial L knee to hurt; Hip is feeling better and pain is then the left low back  -         Subjective Pain    Able to rate subjective pain?  yes  -      Pre-Treatment Pain Level  6  -      Post-Treatment Pain Level  3  -         Total Minutes    15879 - PT Therapeutic Exercise Minutes  19  -KH      91835 - PT Therapeutic Activity Minutes  15  -      16575 - PT Manual Therapy Minutes  10  -KH         Exercise 1    Exercise Name 1  pro ll LE/UE ROM/Strength   -      Cueing 1  Verbal;Demo  -      Time 1  10 mins   -      Additional Comments  level 1; seat 13  -         Exercise 2     "Exercise Name 2  CR/TR  -KH      Cueing 2  Verbal  -KH      Sets 2  2  -KH      Reps 2  10  -KH         Exercise 3    Exercise Name 3  standing hip 3 way B  -KH      Cueing 3  Verbal  -KH      Sets 3  2  -KH      Reps 3  10 each   -KH         Exercise 4    Exercise Name 4  Balance Training: Split stance R/L lead  -KH      Sets 4  3  -KH      Time 4  15 sec holds each  -KH      Additional Comments     -KH         Exercise 5    Exercise Name 5  Balance: FA/EC  -KH      Sets 5  3  -KH      Time 5  15\" holds  -KH         Exercise 6    Exercise Name 6  Balance FT/EC  -KH      Cueing 6  Verbal  -KH      Sets 6  3  -KH      Time 6  15\" holds  -KH         Exercise 7    Exercise Name 7  STM to L low back   -KH        User Key  (r) = Recorded By, (t) = Taken By, (c) = Cosigned By    Initials Name Provider Type    Graciela Polanco PTA Physical Therapy Assistant                      Manual Rx (last 36 hours)      Manual Treatments     Row Name 04/29/21 1100             Total Minutes    68509 - PT Manual Therapy Minutes  10  -KH         Manual Rx 1    Manual Rx 1 Location  L low back/Paraspinals and quadrauts  -KH      Manual Rx 1 Type  MFR/STM seated  -KH        User Key  (r) = Recorded By, (t) = Taken By, (c) = Cosigned By    Initials Name Provider Type    Graciela Polanco PTA Physical Therapy Assistant          PT OP Goals     Row Name 04/29/21 1100          PT Short Term Goals    STG Date to Achieve  04/27/21  -     STG 1  Pt independent with HEP for left hip strengthening and ROM  -     STG 1 Progress  Progressing  -     STG 2  Improve L hip abduction MMT to 4/5 or better  -     STG 2 Progress  Progressing  -     STG 3  Improve L hip flex AROM to 95°  -     STG 3 Progress  Progressing  -     STG 4  Improve Tinetti balance test score to >/= 20/28 to reduce pt's fall risk  -     STG 4 Progress  Progressing  -        Time Calculation    PT Goal Re-Cert Due Date  04/27/21  -       User Key  (r) = Recorded By, " (t) = Taken By, (c) = Cosigned By    Initials Name Provider Type     Graciela Cueva PTA Physical Therapy Assistant                         Time Calculation:   Start Time: 1108  Stop Time: 1157  Time Calculation (min): 49 min  Time Calculation- PT  Start Time: 1108  Stop Time: 1157  Time Calculation (min): 49 min  PT Goal Re-Cert Due Date: 04/27/21  Timed Charges  99349 - PT Therapeutic Exercise Minutes: 19  40660 - PT Manual Therapy Minutes: 10  93987 - PT Therapeutic Activity Minutes: 15  Total Minutes  Timed Charges Total Minutes: 44   Total Minutes: 44  Therapy Charges for Today     Code Description Service Date Service Provider Modifiers Qty    94317438635 HC PT THER PROC EA 15 MIN 4/29/2021 Graciela Cueva, FREDDY GP 1    85034870268 HC PT THERAPEUTIC ACT EA 15 MIN 4/29/2021 Graciela Cueva, FREDDY GP 1    53952092005 HC PT MANUAL THERAPY EA 15 MIN 4/29/2021 Graciela Cueva PTA GP 1                    Graciela Cueva PTA  4/29/2021

## 2021-05-04 ENCOUNTER — HOSPITAL ENCOUNTER (OUTPATIENT)
Dept: PHYSICAL THERAPY | Facility: HOSPITAL | Age: 71
Setting detail: THERAPIES SERIES
Discharge: HOME | End: 2021-05-04

## 2021-05-04 DIAGNOSIS — R29.6 FREQUENT FALLS: ICD-10-CM

## 2021-05-04 DIAGNOSIS — M25.552 LEFT HIP PAIN: Primary | ICD-10-CM

## 2021-05-04 DIAGNOSIS — R53.1 GENERALIZED WEAKNESS: ICD-10-CM

## 2021-05-04 PROCEDURE — 97112 NEUROMUSCULAR REEDUCATION: CPT | Performed by: PHYSICAL THERAPIST

## 2021-05-04 PROCEDURE — 97110 THERAPEUTIC EXERCISES: CPT | Performed by: PHYSICAL THERAPIST

## 2021-05-04 NOTE — THERAPY PROGRESS REPORT/RE-CERT
Outpatient Physical Therapy Ortho Progress Note  [facility]     Patient Name: Laura West  : 1950  MRN: [id number]  Today's Date: 2021      Visit Date: 2021  Subjective Improvement: 30%  Attendance:   (Eval + 6 -)  Next MD Visit : TBD  Recert Date:  21        Therapy Diagnosis:  L Hip/LBP Pain  Visit Dx:    ICD-10-CM ICD-9-CM   1. Left hip pain  M25.552 719.45   2. Frequent falls  R29.6 V15.88   3. Generalized weakness  R53.1 780.79       Patient Active Problem List   Diagnosis   • Pseudophakia   • Diabetes mellitus without complication (CMS/HCC)        Past Medical History:   Diagnosis Date   • Acute anterior uveitis     OD, improved      • Acute bronchitis    • Acute gouty arthropathy     left 1st MTP joint    • Acute sinusitis    • Artificial lens present     IN POSITION - OU   • Benign neoplasm of skin of eyelid      s/p excision hidrocystoma RLL   • Cellulitis     rt distal arm mild      • Common cold    • Contusion    • COPD (chronic obstructive pulmonary disease) (CMS/HCC)    • Cortical senile cataract     OS   • Cough    • Dysuria    • Gout    • Gouty arthropathy    • Hand pain    • Hyperlipidemia    • Hypertension    • Knee pain    • Posterior subcapsular polar age-related cataract     OS   • Type 2 diabetes with complication (CMS/HCC)    • Vertigo    • Vitreous floaters     asteroid hyalosis OS    • Vitreous opacities     asteroid OS           Past Surgical History:   Procedure Laterality Date   • CATARACT EXTRACTION  2014    Remove cataract, insert lens (Left eye.)   • CATARACT EXTRACTION  2014    Remove cataract, insert lens (Right eye.)   • CATARACT EXTRACTION Bilateral    • EXCISION LESION  2014    EYELID EXCISION LESION 75933 (Benign neoplasm of skin of eyelid)    • INJECTION OF MEDICATION  2012    KENALOG   • OTHER SURGICAL HISTORY  2014    OPTICAL BIOMETRY 64416 (1)      • PACEMAKER IMPLANTATION         PT Ortho      "Row Name 05/04/21 0016       Subjective Comments    Subjective Comments  30% improvement. Doing HEP 2x/day.   -BS       Precautions and Contraindications    Precautions/Limitations  fall precautions  -BS    Precautions  pacemaker-no modalities  -BS       Subjective Pain    Able to rate subjective pain?  yes  -BS    Pre-Treatment Pain Level  7  -BS       General ROM    GENERAL ROM COMMENTS  AROM: L hip flex 80°  -BS       MMT (Manual Muscle Testing)    General MMT Comments  L hip-abd 3-/5 flex 3+/5 R hip flex 4+/5   -BS      User Key  (r) = Recorded By, (t) = Taken By, (c) = Cosigned By    Initials Name Provider Type    Terrance Gates, PT Physical Therapist                      PT Assessment/Plan     Row Name 05/04/21 1400          PT Assessment    Assessment Comments  Met balance goal (Tinetti test), unchanged L hip abduction strength and L hip flex ROM since the PT consult. Pt very motivated to resume PLOF and continue skilled PT to maximize standing balance and LE strength.   -BS        PT Plan    PT Frequency  2x/week  -BS     Predicted Duration of Therapy Intervention (PT)  additional 3 weeks (6 sessions)  -BS     PT Plan Comments  1 more authorized session and then will need to wait for authorization for more care.   -BS       User Key  (r) = Recorded By, (t) = Taken By, (c) = Cosigned By    Initials Name Provider Type    Terrance Gates, PT Physical Therapist            OP Exercises     Row Name 05/04/21 4906             Subjective Comments    Subjective Comments  30% improvement. Doing HEP 2x/day.   -BS         Subjective Pain    Able to rate subjective pain?  yes  -BS      Pre-Treatment Pain Level  7  -BS      Post-Treatment Pain Level  6  -BS         Exercise 1    Exercise Name 1  SKC L w/ towel  -BS      Sets 1  1  -BS      Reps 1  3  -BS      Time 1  30\" hold  -BS      Additional Comments  L only  -BS         Exercise 2    Exercise Name 2  HL alt marching   -BS      Sets 2  1  -BS      Reps 2  10  " -BS         Exercise 3    Exercise Name 3  30 sec STS test  -BS      Time 3  4 reps  -BS         Exercise 4    Exercise Name 4  ambulation x 1 clinic (270' ) in the clinic  -BS      Reps 4  using Hurrycane  -BS         Exercise 5    Exercise Name 5  standing HR/TR  -BS      Sets 5  1  -BS      Reps 5  15 ea  -BS         Exercise 6    Exercise Name 6  standing B hip ext/abd  -BS      Sets 6  1  -BS      Reps 6  10 ea  -BS        User Key  (r) = Recorded By, (t) = Taken By, (c) = Cosigned By    Initials Name Provider Type    Terrance Gates, PT Physical Therapist                       PT OP Goals     Row Name 05/04/21 1358          PT Short Term Goals    STG Date to Achieve  05/25/21  -BS     STG 1  Pt independent with HEP for left hip strengthening and ROM  -BS     STG 1 Progress  Progressing  -BS     STG 2  Improve L hip abduction MMT to 4/5 or better  -BS     STG 2 Progress  Ongoing  -BS     STG 3  Improve L hip flex AROM to 95°  -BS     STG 3 Progress  Progressing;Ongoing  -BS     STG 4  Improve Tinetti balance test score to >/= 24/28 to reduce pt's fall risk  -BS     STG 4 Progress  Met;Goal Revised  -BS     STG 5  Improve 30 sec sit to stand test to 7 reps  -BS     STG 5 Progress  New  -BS        Time Calculation    PT Goal Re-Cert Due Date  05/25/21  -BS       User Key  (r) = Recorded By, (t) = Taken By, (c) = Cosigned By    Initials Name Provider Type    Terrance Gates, PT Physical Therapist               Outcome Measure Options: Tinetti, 30 Second Chair Stand Test  30 Second Chair Stand Test  30 Second Chair Stand Test: 4 reps with B UE A  Tinetti Assessment  Tinetti Assessment: yes  Sitting Balance: Steady,safe  Arises: Able in 1 attempt  Attempts to Rise: Able in 1 attempt  Immediate Standing Balance (first 5 sec): Steady without support  Standing Balance: Narrow stance, without support  Sternal Nudge (feet close together): Steady  Eyes Closed (feet close together): Unsteady  Turning 360 Degrees-  "Steps: Discontinuous steps  Turning 360 Degrees- Steadiness: Steady  Sitting Down: Uses arms or not a smooth motion  Tinetti Balance Score: 13  Gait Initiation (immediate after told \"go\"): No hesitancy  Step Length- Right Swing: Right swing foot passes Left stance leg  Step Length- Left Swing: Left swing foot passes Right  Foot Clearance- Right Foot: Right foot completely clears floor  Foot Clearance- Left Foot: Left foot completely clears floor  Step Symmetry: Right and Left step length equal  Step Continuity: Steps appear continuous  Path (excursion): Mild/moderate deviation or use of aid  Trunk: Marked sway or uses device  Base of Support: Heels apart  Gait Score: 8  Tinetti Total Score: 21  Tinetti Assistive Device: other (see comments)  Tinetti Assessment Comments: using [employer]utter[employer]ane      Time Calculation:   Start Time: 1358  Stop Time: 1436  Time Calculation (min): 38 min  Total Timed Code Minutes- PT: 38 minute(s)  Time Calculation- PT  Start Time: 1358  Stop Time: 1436  Time Calculation (min): 38 min  Total Timed Code Minutes- PT: 38 minute(s)  PT Goal Re-Cert Due Date: 05/25/21  Therapy Charges for Today     Code Description Service Date Service Provider Modifiers Qty    04165540160 HC PT THER PROC EA 15 MIN 5/4/2021 Terrance Phelps, PT GP 2    87211175606 HC PT NEUROMUSC RE EDUCATION EA 15 MIN 5/4/2021 Terrance Phelps, PT GP 1          PT G-Codes  Outcome Measure Options: Tinetti, 30 Second Chair Stand Test  Tinetti Total Score: 21         Terrance Phelps, PT  5/4/2021     "

## 2021-05-06 ENCOUNTER — HOSPITAL ENCOUNTER (OUTPATIENT)
Dept: PHYSICAL THERAPY | Facility: HOSPITAL | Age: 71
Setting detail: THERAPIES SERIES
Discharge: HOME | End: 2021-05-06

## 2021-05-06 DIAGNOSIS — R53.1 GENERALIZED WEAKNESS: ICD-10-CM

## 2021-05-06 DIAGNOSIS — R29.6 FREQUENT FALLS: ICD-10-CM

## 2021-05-06 DIAGNOSIS — M25.552 LEFT HIP PAIN: Primary | ICD-10-CM

## 2021-05-06 PROCEDURE — 97110 THERAPEUTIC EXERCISES: CPT

## 2021-05-06 PROCEDURE — 97140 MANUAL THERAPY 1/> REGIONS: CPT

## 2021-05-06 NOTE — THERAPY TREATMENT NOTE
Outpatient Physical Therapy Ortho Treatment Note  [facility]     Patient Name: Laura West  : 1950  MRN: [id number]  Today's Date: 2021      Visit Date: 2021     Subjective Improvement: 30%  Attendance:   (Eval + 6 -)  Next MD Visit : TBD  Recert Date:  21        Therapy Diagnosis:  L Hip/LBP Pain    Visit Dx:    ICD-10-CM ICD-9-CM   1. Left hip pain  M25.552 719.45   2. Frequent falls  R29.6 V15.88   3. Generalized weakness  R53.1 780.79       Patient Active Problem List   Diagnosis   • Pseudophakia   • Diabetes mellitus without complication (CMS/HCC)        Past Medical History:   Diagnosis Date   • Acute anterior uveitis     OD, improved      • Acute bronchitis    • Acute gouty arthropathy     left 1st MTP joint    • Acute sinusitis    • Artificial lens present     IN POSITION - OU   • Benign neoplasm of skin of eyelid      s/p excision hidrocystoma RLL   • Cellulitis     rt distal arm mild      • Common cold    • Contusion    • COPD (chronic obstructive pulmonary disease) (CMS/HCC)    • Cortical senile cataract     OS   • Cough    • Dysuria    • Gout    • Gouty arthropathy    • Hand pain    • Hyperlipidemia    • Hypertension    • Knee pain    • Posterior subcapsular polar age-related cataract     OS   • Type 2 diabetes with complication (CMS/HCC)    • Vertigo    • Vitreous floaters     asteroid hyalosis OS    • Vitreous opacities     asteroid OS           Past Surgical History:   Procedure Laterality Date   • CATARACT EXTRACTION  2014    Remove cataract, insert lens (Left eye.)   • CATARACT EXTRACTION  2014    Remove cataract, insert lens (Right eye.)   • CATARACT EXTRACTION Bilateral    • EXCISION LESION  2014    EYELID EXCISION LESION 94517 (Benign neoplasm of skin of eyelid)    • INJECTION OF MEDICATION  2012    KENALOG   • OTHER SURGICAL HISTORY  2014    OPTICAL BIOMETRY 25292 (1)      • PACEMAKER IMPLANTATION         PT  Ortho     Row Name 05/06/21 1300       Precautions and Contraindications    Precautions/Limitations  fall precautions  -    Precautions  pacemaker-no modalities  -       Posture/Observations    Posture/Observations Comments  gait with hurrycane; fwd flex at trunk; improved trendlenberg this date  -      User Key  (r) = Recorded By, (t) = Taken By, (c) = Cosigned By    Initials Name Provider Type    Graciela Polanco PTA Physical Therapy Assistant                      PT Assessment/Plan     Row Name 05/06/21 1300          PT Assessment    Assessment Comments  progressing well with core stability and balance; decreased pain with STM   -        PT Plan    PT Frequency  2x/week  -     Predicted Duration of Therapy Intervention (PT)  additional 3 weeks  -     PT Plan Comments  Awaiting authurozation for more vistis; Continue with strength and endurance as able.   -       User Key  (r) = Recorded By, (t) = Taken By, (c) = Cosigned By    Initials Name Provider Type    Graciela Polanco PTA Physical Therapy Assistant            OP Exercises     Row Name 05/06/21 1300             Subjective Comments    Subjective Comments  Patient reports that she is having a little bit more pain today in L hip and knee; feels like she is standing and doing more which is making her more sore;   -         Subjective Pain    Able to rate subjective pain?  yes  -      Pre-Treatment Pain Level  7  -      Post-Treatment Pain Level  4  -         Total Minutes    42521 - PT Therapeutic Exercise Minutes  33  -KH      13986 - PT Manual Therapy Minutes  10  -KH         Exercise 1    Exercise Name 1  pro ll LE/UE ROM/Strength   -      Cueing 1  Verbal;Demo  -      Time 1  10 mins   -      Additional Comments  level 2; seat 14  -KH         Exercise 2    Exercise Name 2  sit to stands from high low table   -      Sets 2  2  -KH      Reps 2  5  -KH      Additional Comments  no UE assist, table high  -KH         Exercise 3     Exercise Name 3  bayron disc-seated alt hip flexion  -KH      Cueing 3  Verbal  -KH      Sets 3  2  -KH      Reps 3  10  -KH      Additional Comments  for balance and core stability  -KH         Exercise 4    Exercise Name 4  bayron disc-seated LAQ  -KH      Cueing 4  Verbal  -KH      Sets 4  2  -KH      Reps 4  10  -KH      Additional Comments  for balance and core stability  -KH         Exercise 5    Exercise Name 5  bayron disc-UE movements for balance and core stability  -KH      Sets 5  2  -KH      Reps 5  10 each   -KH         Exercise 6    Exercise Name 6  Manual: see manual tab  -        User Key  (r) = Recorded By, (t) = Taken By, (c) = Cosigned By    Initials Name Provider Type    Graciela Polanco PTA Physical Therapy Assistant                      Manual Rx (last 36 hours)      Manual Treatments     Row Name 05/06/21 1300             Total Minutes    12830 - PT Manual Therapy Minutes  10  -KH         Manual Rx 1    Manual Rx 1 Location  L low back/Paraspinals and quadrauts  -      Manual Rx 1 Type  MFR/STM seated  -        User Key  (r) = Recorded By, (t) = Taken By, (c) = Cosigned By    Initials Name Provider Type    Graciela Polanco PTA Physical Therapy Assistant          PT OP Goals     Row Name 05/06/21 1300          PT Short Term Goals    STG Date to Achieve  05/25/21  -     STG 1  Pt independent with HEP for left hip strengthening and ROM  -     STG 1 Progress  Progressing  -     STG 2  Improve L hip abduction MMT to 4/5 or better  -     STG 2 Progress  Ongoing  -     STG 3  Improve L hip flex AROM to 95°  -     STG 3 Progress  Progressing;Ongoing  -     STG 4  Improve Tinetti balance test score to >/= 24/28 to reduce pt's fall risk  -     STG 4 Progress  Met;Goal Revised  -     STG 5  Improve 30 sec sit to stand test to 7 reps  -     STG 5 Progress  New  -        Time Calculation    PT Goal Re-Cert Due Date  05/25/21  -       User Key  (r) = Recorded By, (t) = Taken By, (c) =  Cosigned By    Initials Name Provider Type     Graciela Cueva PTA Physical Therapy Assistant                         Time Calculation:   Start Time: 1302  Stop Time: 1345  Time Calculation (min): 43 min  Time Calculation- PT  Start Time: 1302  Stop Time: 1345  Time Calculation (min): 43 min  PT Goal Re-Cert Due Date: 05/25/21  Timed Charges  13280 - PT Therapeutic Exercise Minutes: 33  67872 - PT Manual Therapy Minutes: 10  Total Minutes  Timed Charges Total Minutes: 43   Total Minutes: 43  Therapy Charges for Today     Code Description Service Date Service Provider Modifiers Qty    75277508649 HC PT MANUAL THERAPY EA 15 MIN 5/6/2021 Graciela Cueva PTA GP 1    49810275229 HC PT THER PROC EA 15 MIN 5/6/2021 Graciela Cueva PTA GP 2                    Graciela Cueva PTA  5/6/2021

## 2021-05-11 ENCOUNTER — APPOINTMENT (OUTPATIENT)
Dept: PHYSICAL THERAPY | Facility: HOSPITAL | Age: 71
End: 2021-05-11

## 2021-05-13 ENCOUNTER — HOSPITAL ENCOUNTER (OUTPATIENT)
Dept: PHYSICAL THERAPY | Facility: HOSPITAL | Age: 71
Setting detail: THERAPIES SERIES
Discharge: HOME | End: 2021-05-13

## 2021-05-13 DIAGNOSIS — R29.6 FREQUENT FALLS: ICD-10-CM

## 2021-05-13 DIAGNOSIS — M25.552 LEFT HIP PAIN: Primary | ICD-10-CM

## 2021-05-13 DIAGNOSIS — R53.1 GENERALIZED WEAKNESS: ICD-10-CM

## 2021-05-13 PROCEDURE — 97110 THERAPEUTIC EXERCISES: CPT

## 2021-05-13 NOTE — THERAPY TREATMENT NOTE
Outpatient Physical Therapy Ortho Treatment Note  [facility]     Patient Name: Laura West  : 1950  MRN: [id number]  Today's Date: 2021      Visit Date: 2021   Attendance:   Subjective improvement: 45%  Recert: 21  MD Appointment: TBD      Visit Dx:    ICD-10-CM ICD-9-CM   1. Left hip pain  M25.552 719.45   2. Frequent falls  R29.6 V15.88   3. Generalized weakness  R53.1 780.79       Patient Active Problem List   Diagnosis   • Pseudophakia   • Diabetes mellitus without complication (CMS/HCC)        Past Medical History:   Diagnosis Date   • Acute anterior uveitis     OD, improved      • Acute bronchitis    • Acute gouty arthropathy     left 1st MTP joint    • Acute sinusitis    • Artificial lens present     IN POSITION - OU   • Benign neoplasm of skin of eyelid      s/p excision hidrocystoma RLL   • Cellulitis     rt distal arm mild      • Common cold    • Contusion    • COPD (chronic obstructive pulmonary disease) (CMS/HCC)    • Cortical senile cataract     OS   • Cough    • Dysuria    • Gout    • Gouty arthropathy    • Hand pain    • Hyperlipidemia    • Hypertension    • Knee pain    • Posterior subcapsular polar age-related cataract     OS   • Type 2 diabetes with complication (CMS/HCC)    • Vertigo    • Vitreous floaters     asteroid hyalosis OS    • Vitreous opacities     asteroid OS           Past Surgical History:   Procedure Laterality Date   • CATARACT EXTRACTION  2014    Remove cataract, insert lens (Left eye.)   • CATARACT EXTRACTION  2014    Remove cataract, insert lens (Right eye.)   • CATARACT EXTRACTION Bilateral    • EXCISION LESION  2014    EYELID EXCISION LESION 68505 (Benign neoplasm of skin of eyelid)    • INJECTION OF MEDICATION  2012    KENALOG   • OTHER SURGICAL HISTORY  2014    OPTICAL BIOMETRY 34613 (1)      • PACEMAKER IMPLANTATION         PT Ortho     Row Name 21 1100       Precautions and  "Contraindications    Precautions/Limitations  fall precautions  -EM    Precautions  (S) pacemaker-no modalities  -EM       Posture/Observations    Posture/Observations Comments  Pt amb with hurry cane(bent from previous fall) Pt has valgus L knee. B foot pronation noted. Small bruise noted medial L knee. Mod TTP L piriformis, Mild TTP L ITB.  -EM      User Key  (r) = Recorded By, (t) = Taken By, (c) = Cosigned By    Initials Name Provider Type    Derrick Espinoza PTA Physical Therapy Assistant                      PT Assessment/Plan     Row Name 05/13/21 1100          PT Assessment    Assessment Comments  Pt osmar tx well. PTA recommended pt to obtain a SC due to current cane being badly bent. Pt req freq rest breaks due to fatigue. Pt displays L LE weakness>R LE.  -EM        PT Plan    PT Frequency  2x/week  -EM     Predicted Duration of Therapy Intervention (PT)  3 wks  -EM     PT Plan Comments  5 visits remain approved. Cont hip/knee/core sterngthening with gentle progression as osmar. Monitor tolerance to CKC thered due to L knee pain.   -EM       User Key  (r) = Recorded By, (t) = Taken By, (c) = Cosigned By    Initials Name Provider Type    Derrick Espinoza PTA Physical Therapy Assistant          Modalities     Row Name 05/13/21 1100             Subjective Pain    Post-Treatment Pain Level  2  -EM        User Key  (r) = Recorded By, (t) = Taken By, (c) = Cosigned By    Initials Name Provider Type    Derrick Espinoza PTA Physical Therapy Assistant        OP Exercises     Row Name 05/13/21 1100             Subjective Comments    Subjective Comments  Pt reports no new changes since last tx. Pt reports her L knee bothered her last tx stating \"I couldnt do much for 2 days\"  -EM         Subjective Pain    Able to rate subjective pain?  yes  -EM      Pre-Treatment Pain Level  5  -EM      Post-Treatment Pain Level  2  -EM         Exercise 1    Exercise Name 1  Pro ll LE/UE ROM/Strength   -EM      Cueing 1  " "Verbal;Demo  -EM      Time 1  10 mins   -EM      Additional Comments  4.0  -EM         Exercise 2    Exercise Name 2  B Fwd Step Ups-4\"  -EM      Cueing 2  Verbal;Demo  -EM      Sets 2  1  -EM      Reps 2  20  -EM         Exercise 3    Exercise Name 3  St Marches  -EM      Cueing 3  Verbal;Demo;Tactile  -EM      Sets 3  2  -EM      Reps 3  10  -EM      Additional Comments  Slight increase in LBP  -EM         Exercise 4    Exercise Name 4  B LAQ  -EM      Cueing 4  Verbal;Demo  -EM      Sets 4  1  -EM      Reps 4  20  -EM         Exercise 5    Exercise Name 5  B SL Clamshells  -EM      Cueing 5  Verbal;Tactile;Demo  -EM      Sets 5  1  -EM      Reps 5  20  -EM         Exercise 6    Exercise Name 6  B SL Rev Clamshells  -EM      Cueing 6  Verbal;Tactile;Demo  -EM      Sets 6  1  -EM      Reps 6  20  -EM         Exercise 7    Exercise Name 7  B SL Hip Abd  -EM      Cueing 7  Verbal;Demo  -EM      Sets 7  1  -EM      Reps 7  20  -EM         Exercise 8    Exercise Name 8  B Man Piriformis S  -EM      Sets 8  3  -EM      Time 8  30\"  -EM        User Key  (r) = Recorded By, (t) = Taken By, (c) = Cosigned By    Initials Name Provider Type    EM Derrick Tong, PTA Physical Therapy Assistant                       PT OP Goals     Row Name 05/13/21 1200 05/13/21 1100       PT Short Term Goals    STG Date to Achieve  05/25/21  -EM  05/25/21  -EM    STG 1  Pt independent with HEP for left hip strengthening and ROM  -EM  Pt independent with HEP for left hip strengthening and ROM  -EM    STG 1 Progress  Progressing  -EM  Progressing  -EM    STG 2  Improve L hip abduction MMT to 4/5 or better  -EM  Improve L hip abduction MMT to 4/5 or better  -EM    STG 2 Progress  Ongoing  -EM  Ongoing  -EM    STG 3  Improve L hip flex AROM to 95°  -EM  Improve L hip flex AROM to 95°  -EM    STG 3 Progress  Progressing;Ongoing  -EM  Progressing;Ongoing  -EM    STG 4  Improve Tinetti balance test score to >/= 24/28 to reduce pt's fall risk  -EM  " Improve Tinetti balance test score to >/= 24/28 to reduce pt's fall risk  -EM    STG 4 Progress  Met;Goal Revised  -EM  Met;Goal Revised  -EM    STG 5  Improve 30 sec sit to stand test to 7 reps  -EM  Improve 30 sec sit to stand test to 7 reps  -EM    STG 5 Progress  Not Met  -EM  Not Met  -EM       Time Calculation    PT Goal Re-Cert Due Date  05/25/21  -EM  05/25/21  -EM      User Key  (r) = Recorded By, (t) = Taken By, (c) = Cosigned By    Initials Name Provider Type    EM Derrick Tong PTA Physical Therapy Assistant                         Time Calculation:   Start Time: 1103  Stop Time: 1158  Time Calculation (min): 55 min  Total Timed Code Minutes- PT: 55 minute(s)  Therapy Charges for Today     Code Description Service Date Service Provider Modifiers Qty    03402001630 HC PT THER PROC EA 15 MIN 5/13/2021 Derrick Tong PTA GP 4                    Derrick Tong PTA  5/13/2021

## 2021-05-17 ENCOUNTER — HOSPITAL ENCOUNTER (OUTPATIENT)
Dept: PHYSICAL THERAPY | Facility: HOSPITAL | Age: 71
Setting detail: THERAPIES SERIES
Discharge: HOME | End: 2021-05-17

## 2021-05-17 DIAGNOSIS — M25.552 LEFT HIP PAIN: Primary | ICD-10-CM

## 2021-05-17 PROCEDURE — 97110 THERAPEUTIC EXERCISES: CPT | Performed by: PHYSICAL THERAPIST

## 2021-05-17 NOTE — THERAPY TREATMENT NOTE
Outpatient Physical Therapy Ortho Treatment Note  [facility]     Patient Name: Laura West  : 1950  MRN: [id number]  Today's Date: 2021      Visit Date: 2021  Subjective Improvement: 30%  Attendance:   (Eval + 6 -)  Next MD Visit : TBD  Recert Date:  21        Therapy Diagnosis:  L Hip/LBP Pain  Visit Dx:    ICD-10-CM ICD-9-CM   1. Left hip pain  M25.552 719.45       Patient Active Problem List   Diagnosis   • Pseudophakia   • Diabetes mellitus without complication (CMS/HCC)        Past Medical History:   Diagnosis Date   • Acute anterior uveitis     OD, improved      • Acute bronchitis    • Acute gouty arthropathy     left 1st MTP joint    • Acute sinusitis    • Artificial lens present     IN POSITION - OU   • Benign neoplasm of skin of eyelid      s/p excision hidrocystoma RLL   • Cellulitis     rt distal arm mild      • Common cold    • Contusion    • COPD (chronic obstructive pulmonary disease) (CMS/HCC)    • Cortical senile cataract     OS   • Cough    • Dysuria    • Gout    • Gouty arthropathy    • Hand pain    • Hyperlipidemia    • Hypertension    • Knee pain    • Posterior subcapsular polar age-related cataract     OS   • Type 2 diabetes with complication (CMS/HCC)    • Vertigo    • Vitreous floaters     asteroid hyalosis OS    • Vitreous opacities     asteroid OS           Past Surgical History:   Procedure Laterality Date   • CATARACT EXTRACTION  2014    Remove cataract, insert lens (Left eye.)   • CATARACT EXTRACTION  2014    Remove cataract, insert lens (Right eye.)   • CATARACT EXTRACTION Bilateral    • EXCISION LESION  2014    EYELID EXCISION LESION 95035 (Benign neoplasm of skin of eyelid)    • INJECTION OF MEDICATION  2012    KENALOG   • OTHER SURGICAL HISTORY  2014    OPTICAL BIOMETRY 88119 (1)      • PACEMAKER IMPLANTATION         PT Ortho     Row Name 21 0800       Subjective Comments    Subjective Comments   "Pt reports using intermittent a tall wooden walking stick for ambulation.   -BS       Precautions and Contraindications    Precautions/Limitations  fall precautions  -BS    Precautions  pacemaker-no modalities  -BS       Subjective Pain    Post-Treatment Pain Level  4  -BS      User Key  (r) = Recorded By, (t) = Taken By, (c) = Cosigned By    Initials Name Provider Type    Terrance Gates, PT Physical Therapist                      PT Assessment/Plan     Row Name 05/17/21 0813          PT Assessment    Assessment Comments  Patient limited by core trunk instability as noted by LBP elicited with resisted seated marching and LAQ's. Required cues to maintain erect sitting posture due to compensating with trunk extension.  -BS        PT Plan    PT Frequency  2x/week  -BS     Predicted Duration of Therapy Intervention (PT)  3 weeks  -BS     PT Plan Comments  continue with core stability training-bridges, HL alt leg lifts.   -BS       User Key  (r) = Recorded By, (t) = Taken By, (c) = Cosigned By    Initials Name Provider Type    Terrance Gates PT Physical Therapist            OP Exercises     Row Name 05/17/21 0800             Subjective Comments    Subjective Comments  Pt reports using intermittent a tall wooden walking stick for ambulation.   -BS         Subjective Pain    Able to rate subjective pain?  yes  -BS      Pre-Treatment Pain Level  6  -BS      Post-Treatment Pain Level  4  -BS         Exercise 1    Exercise Name 1  Pro ll LE/UE ROM/Strength   -BS      Time 1  10 mins   -BS      Additional Comments  3.0  -BS         Exercise 2    Exercise Name 2  B Fwd Step Ups-4\"  -BS      Sets 2  1  -BS      Reps 2  10  -BS         Exercise 3    Exercise Name 3  sit to stand, no UEA  -BS      Sets 3  1  -BS      Reps 3  10  -BS         Exercise 4    Exercise Name 4  resisted B LAQ's with 3#  -BS      Sets 4  1  -BS      Reps 4  10  -BS         Exercise 5    Exercise Name 5  resisted seated B marching with 3# wt  -BS   "    Sets 5  1  -BS      Reps 5  10 ea  -BS        User Key  (r) = Recorded By, (t) = Taken By, (c) = Cosigned By    Initials Name Provider Type    Terrance Gates, PT Physical Therapist                       PT OP Goals     Row Name 05/17/21 0815 05/17/21 0800       PT Short Term Goals    STG Date to Achieve  05/25/21  -BS  --    STG 1  Pt independent with HEP for left hip strengthening and ROM  -BS  --    STG 1 Progress  Progressing  -BS  --    STG 2  Improve L hip abduction MMT to 4/5 or better  -BS  --    STG 2 Progress  Ongoing  -BS  --    STG 3  Improve L hip flex AROM to 95°  -BS  --    STG 3 Progress  Progressing;Ongoing  -BS  --    STG 4  Improve Tinetti balance test score to >/= 24/28 to reduce pt's fall risk  -BS  --    STG 4 Progress  Met;Goal Revised  -BS  --    STG 5  Improve 30 sec sit to stand test to 7 reps  -BS  --    STG 5 Progress  Not Met  -BS  --       Time Calculation    PT Goal Re-Cert Due Date  --  -BS  05/25/21  -BS      User Key  (r) = Recorded By, (t) = Taken By, (c) = Cosigned By    Initials Name Provider Type    Terrance Gates PT Physical Therapist                         Time Calculation:   Start Time: 0813  Stop Time: 0846  Time Calculation (min): 33 min  Therapy Charges for Today     Code Description Service Date Service Provider Modifiers Qty    24806837634 HC PT THER PROC EA 15 MIN 5/17/2021 Terrance Phelps, PT GP 2                    Terrance Phelps PT  5/17/2021

## 2021-05-18 ENCOUNTER — APPOINTMENT (OUTPATIENT)
Dept: PHYSICAL THERAPY | Facility: HOSPITAL | Age: 71
End: 2021-05-18

## 2021-05-20 ENCOUNTER — HOSPITAL ENCOUNTER (OUTPATIENT)
Dept: PHYSICAL THERAPY | Facility: HOSPITAL | Age: 71
Setting detail: THERAPIES SERIES
Discharge: HOME | End: 2021-05-20

## 2021-05-20 DIAGNOSIS — R29.6 FREQUENT FALLS: ICD-10-CM

## 2021-05-20 DIAGNOSIS — R53.1 GENERALIZED WEAKNESS: ICD-10-CM

## 2021-05-20 DIAGNOSIS — M25.552 LEFT HIP PAIN: Primary | ICD-10-CM

## 2021-05-20 PROCEDURE — 97110 THERAPEUTIC EXERCISES: CPT

## 2021-05-20 NOTE — THERAPY TREATMENT NOTE
Outpatient Physical Therapy Ortho Treatment Note  [facility]     Patient Name: Laura West  : 1950  MRN: [id number]  Today's Date: 2021      Visit Date: 2021   Subjective Improvement: 30%  Attendance:  10/10 (12 total until 2021)  Next MD Visit : TBD  Recert Date:  21       Visit Dx:    ICD-10-CM ICD-9-CM   1. Left hip pain  M25.552 719.45   2. Frequent falls  R29.6 V15.88   3. Generalized weakness  R53.1 780.79       Patient Active Problem List   Diagnosis   • Pseudophakia   • Diabetes mellitus without complication (CMS/HCC)        Past Medical History:   Diagnosis Date   • Acute anterior uveitis     OD, improved      • Acute bronchitis    • Acute gouty arthropathy     left 1st MTP joint    • Acute sinusitis    • Artificial lens present     IN POSITION - OU   • Benign neoplasm of skin of eyelid      s/p excision hidrocystoma RLL   • Cellulitis     rt distal arm mild      • Common cold    • Contusion    • COPD (chronic obstructive pulmonary disease) (CMS/HCC)    • Cortical senile cataract     OS   • Cough    • Dysuria    • Gout    • Gouty arthropathy    • Hand pain    • Hyperlipidemia    • Hypertension    • Knee pain    • Posterior subcapsular polar age-related cataract     OS   • Type 2 diabetes with complication (CMS/HCC)    • Vertigo    • Vitreous floaters     asteroid hyalosis OS    • Vitreous opacities     asteroid OS           Past Surgical History:   Procedure Laterality Date   • CATARACT EXTRACTION  2014    Remove cataract, insert lens (Left eye.)   • CATARACT EXTRACTION  2014    Remove cataract, insert lens (Right eye.)   • CATARACT EXTRACTION Bilateral    • EXCISION LESION  2014    EYELID EXCISION LESION 68310 (Benign neoplasm of skin of eyelid)    • INJECTION OF MEDICATION  2012    KENALOG   • OTHER SURGICAL HISTORY  2014    OPTICAL BIOMETRY 62122 (1)      • PACEMAKER IMPLANTATION         PT Ortho     Row Name 21 1100        Precautions and Contraindications    Precautions/Limitations  fall precautions  -    Precautions  pacemaker-no modalities  -       Posture/Observations    Posture/Observations Comments  patient amb with walking can upon arrival today.   -      User Key  (r) = Recorded By, (t) = Taken By, (c) = Cosigned By    Initials Name Provider Type    Graciela Polanco PTA Physical Therapy Assistant                      PT Assessment/Plan     Row Name 05/20/21 1100          PT Assessment    Assessment Comments  Patient tolerated treatment well this date. Exercises were added and while performing them it was noted that during standing marches hip flexors over compensated for knee flexion, on L more than R. New exercises added to help strenghten quad muscles.   -        PT Plan    PT Frequency  2x/week  -     Predicted Duration of Therapy Intervention (PT)  3 weeks  -     PT Plan Comments  continue with POC and add quad strenghtening exercises, add exercises that encourage active knee flexion; 2 more approved visits after today   -       User Key  (r) = Recorded By, (t) = Taken By, (c) = Cosigned By    Initials Name Provider Type    Graciela Polanco PTA Physical Therapy Assistant          Modalities     Row Name 05/20/21 1100             Subjective Comments    Subjective Comments  pt reports that she has been using a walking stick more often. States that she doesnt use it on uneven surfaces though.   -         Subjective Pain    Able to rate subjective pain?  yes  -      Pre-Treatment Pain Level  4  -      Post-Treatment Pain Level  4  -        User Key  (r) = Recorded By, (t) = Taken By, (c) = Cosigned By    Initials Name Provider Type    Graciela Polanco PTA Physical Therapy Assistant        OP Exercises     Row Name 05/20/21 1100             Subjective Comments    Subjective Comments  pt reports that she has been using a walking stick more often. States that she doesnt use it on uneven surfaces though.    -         Subjective Pain    Able to rate subjective pain?  yes  -KH      Pre-Treatment Pain Level  4  -KH      Post-Treatment Pain Level  4  -KH         Total Minutes    09075 - PT Therapeutic Exercise Minutes  45  -KH         Exercise 1    Exercise Name 1  Pro ll LE/UE ROM/Strength   -      Time 1  10 mins  -KH      Additional Comments  lvl 3; seat 11  -KH         Exercise 2    Exercise Name 2  CR/TR  -      Cueing 2  Verbal  -KH      Reps 2  20  -KH         Exercise 3    Exercise Name 3  standing hip 3 way  -      Cueing 3  Verbal;Tactile;Demo  -KH      Sets 3  2  -KH      Reps 3  10 B  -KH      Additional Comments  1# ext, abd, marches; educated patient on using cane to help stablaize   -         Exercise 4    Exercise Name 4  seated LAQ  -      Cueing 4  Verbal;Tactile;Demo  -KH      Sets 4  2  -KH      Reps 4  10  -KH      Additional Comments  1# vc's for slow and controlled ; airex added to increase seat height   -         Exercise 5    Exercise Name 5  sit to stands  -      Cueing 5  Verbal;Tactile  -      Sets 5  2  -KH      Reps 5  5  -KH      Additional Comments  pt was able to not use hands to stand this date  -        User Key  (r) = Recorded By, (t) = Taken By, (c) = Cosigned By    Initials Name Provider Type    Graciela Polanco PTA Physical Therapy Assistant                       PT OP Goals     Row Name 05/20/21 1100          PT Short Term Goals    STG Date to Achieve  05/25/21  -     STG 1  Pt independent with HEP for left hip strengthening and ROM  -     STG 1 Progress  Progressing  -     STG 2  Improve L hip abduction MMT to 4/5 or better  -     STG 2 Progress  Ongoing  -     STG 3  Improve L hip flex AROM to 95°  -     STG 3 Progress  Progressing;Ongoing  -     STG 4  Improve Tinetti balance test score to >/= 24/28 to reduce pt's fall risk  -     STG 4 Progress  Met;Goal Revised  -     STG 5  Improve 30 sec sit to stand test to 7 reps  -     STG 5 Progress   Ongoing  -JOAN        Time Calculation    PT Goal Re-Cert Due Date  05/25/21  -JOAN       User Key  (r) = Recorded By, (t) = Taken By, (c) = Cosigned By    Initials Name Provider Type    Graciela Polanco PTA Physical Therapy Assistant          Therapy Education  Education Details: educated on importance of performing exercises with correct posture to not use other muscles to compensate. Also, educated on using walking cane to help stabalize self independently during exercises.   Program: New  How Provided: Verbal, Demonstration  Provided to: Patient  Level of Understanding: Verbalized, Demonstrated              Time Calculation:   Start Time: 1105  Stop Time: 1150  Time Calculation (min): 45 min  Timed Charges  20309 - PT Therapeutic Exercise Minutes: 45  Total Minutes  Timed Charges Total Minutes: 45   Total Minutes: 45  Therapy Charges for Today     Code Description Service Date Service Provider Modifiers Qty    38712709010 HC PT THER PROC EA 15 MIN 5/20/2021 Graciela Cueva PTA GP 3    75678377447 HC PT THER SUPP EA 15 MIN 5/20/2021 Graciela Cueva PTA GP 1                    rGaciela Cueva PTA  5/20/2021

## 2021-05-24 ENCOUNTER — HOSPITAL ENCOUNTER (OUTPATIENT)
Dept: PHYSICAL THERAPY | Facility: HOSPITAL | Age: 71
Setting detail: THERAPIES SERIES
Discharge: HOME | End: 2021-05-24

## 2021-05-24 DIAGNOSIS — R29.6 FREQUENT FALLS: ICD-10-CM

## 2021-05-24 DIAGNOSIS — R53.1 GENERALIZED WEAKNESS: ICD-10-CM

## 2021-05-24 DIAGNOSIS — M25.552 LEFT HIP PAIN: Primary | ICD-10-CM

## 2021-05-24 PROCEDURE — 97140 MANUAL THERAPY 1/> REGIONS: CPT

## 2021-05-24 PROCEDURE — 97110 THERAPEUTIC EXERCISES: CPT

## 2021-05-24 NOTE — THERAPY TREATMENT NOTE
Outpatient Physical Therapy Ortho Treatment Note  [facility]     Patient Name: Laura West  : 1950  MRN: [id number]  Today's Date: 2021      Visit Date: 2021     Subjective Improvement: 30%  Attendance:   (12 approved until 2021)  Next MD Visit : 2021  Recert Date:  2021        Therapy Diagnosis:  L Hip/LBP Pain       Visit Dx:    ICD-10-CM ICD-9-CM   1. Left hip pain  M25.552 719.45   2. Frequent falls  R29.6 V15.88   3. Generalized weakness  R53.1 780.79       Patient Active Problem List   Diagnosis   • Pseudophakia   • Diabetes mellitus without complication (CMS/HCC)        Past Medical History:   Diagnosis Date   • Acute anterior uveitis     OD, improved      • Acute bronchitis    • Acute gouty arthropathy     left 1st MTP joint    • Acute sinusitis    • Artificial lens present     IN POSITION - OU   • Benign neoplasm of skin of eyelid      s/p excision hidrocystoma RLL   • Cellulitis     rt distal arm mild      • Common cold    • Contusion    • COPD (chronic obstructive pulmonary disease) (CMS/HCC)    • Cortical senile cataract     OS   • Cough    • Dysuria    • Gout    • Gouty arthropathy    • Hand pain    • Hyperlipidemia    • Hypertension    • Knee pain    • Posterior subcapsular polar age-related cataract     OS   • Type 2 diabetes with complication (CMS/HCC)    • Vertigo    • Vitreous floaters     asteroid hyalosis OS    • Vitreous opacities     asteroid OS           Past Surgical History:   Procedure Laterality Date   • CATARACT EXTRACTION  2014    Remove cataract, insert lens (Left eye.)   • CATARACT EXTRACTION  2014    Remove cataract, insert lens (Right eye.)   • CATARACT EXTRACTION Bilateral    • EXCISION LESION  2014    EYELID EXCISION LESION 68936 (Benign neoplasm of skin of eyelid)    • INJECTION OF MEDICATION  2012    KENALOG   • OTHER SURGICAL HISTORY  2014    OPTICAL BIOMETRY 15494 (1)      • PACEMAKER  IMPLANTATION         PT Ortho     Row Name 05/24/21 0900       Precautions and Contraindications    Precautions/Limitations  fall precautions  -KH    Precautions  pacemaker-no modalities  -       Subjective Pain    Post-Treatment Pain Level  7  -JOAN       Posture/Observations    Posture/Observations Comments  pt with walking stick today for ambulation but asked for a w/c upon arrival secondary to pain and unable to walk that far due to spasms in the L low back;  -JOAN      User Key  (r) = Recorded By, (t) = Taken By, (c) = Cosigned By    Initials Name Provider Type    Graciela Polanco PTA Physical Therapy Assistant                      PT Assessment/Plan     Row Name 05/24/21 0900          PT Assessment    Assessment Comments  decreased treatment this date secondary to increased pain; Had multiple spasms in the low back during treatment but did have decreased pain post and able to walk about 20ft at the end of treatment without a spasm; Pt was given ICE for back to take home and was wheeled to her car with her  via w/c when pt left; lots of education given this date about symptom releif and to rest for afew days with only stretching to be done.Also to not over do when she is feeling well but to do a little at a time and then rest; Pt verbalized understanding and was also discussed with her ;   -OJAN        PT Plan    PT Frequency  2x/week  -JOAN     Predicted Duration of Therapy Intervention (PT)  3 weeks  -JOAN     PT Plan Comments  Recheck next; One more approved visit after today; Will continue as advised  -JOAN       User Key  (r) = Recorded By, (t) = Taken By, (c) = Cosigned By    Initials Name Provider Type    Graciela Polanco PTA Physical Therapy Assistant          Modalities     Row Name 05/24/21 0900             Moist Heat    MH Applied  Yes  -JOAN      Location  Low Back seated  -JOAN        User Key  (r) = Recorded By, (t) = Taken By, (c) = Cosigned By    Initials Name Provider Type    Graciela Polanco PTA  "Physical Therapy Assistant        OP Exercises     Row Name 05/24/21 0900             Subjective Comments    Subjective Comments  Patient comes to PT today with increased pain in her low back; Reports that she over did it yesterday doing yard work watering and putting potting soil; Woke up thi smoring with severe increased pain asking for a w/c to come into PT; On the 3rd plant her left foot was a little turned in when she was bending over and is when she felt a little twinge. She states that she sat down after that for about 5 minutes and then reached for top soil and states that she shouln't have done that didn't feel pain but it may have tightnened on her a little bit but she just doesn't understand why it is feeling this way.   -KH         Subjective Pain    Able to rate subjective pain?  yes  -KH      Pre-Treatment Pain Level  10  -KH      Post-Treatment Pain Level  7  -KH         Total Minutes    87976 - PT Therapeutic Exercise Minutes  15  -KH      13757 - PT Manual Therapy Minutes  15  -KH         Exercise 1    Exercise Name 1  MHP to Low Back seated in W/C  -KH      Time 1  10 mins  -KH      Additional Comments  0850-900  -KH         Exercise 2    Exercise Name 2  seated hamstring stretch B  -KH      Cueing 2  Verbal  -KH      Sets 2  3  -KH      Time 2  30\" holds  -KH         Exercise 3    Exercise Name 3  seated quadratus stretch L  -KH      Cueing 3  Verbal  -KH      Sets 3  3  -KH      Time 3  30\" each   -KH         Exercise 4    Exercise Name 4  STM to L lumbar paraspinals  -KH      Time 4  15 mins  -KH        User Key  (r) = Recorded By, (t) = Taken By, (c) = Cosigned By    Initials Name Provider Type    Graciela Polanco PTA Physical Therapy Assistant                      Manual Rx (last 36 hours)      Manual Treatments     Row Name 05/24/21 0900             Total Minutes    89248 - PT Manual Therapy Minutes  15  -KH        User Key  (r) = Recorded By, (t) = Taken By, (c) = Cosigned By    Initials Name " Provider Type    Graciela Polanco PTA Physical Therapy Assistant          PT OP Goals     Row Name 05/24/21 0900          PT Short Term Goals    STG Date to Achieve  05/25/21  -     STG 1  Pt independent with HEP for left hip strengthening and ROM  -     STG 1 Progress  Progressing  -     STG 2  Improve L hip abduction MMT to 4/5 or better  -     STG 2 Progress  Ongoing  -     STG 3  Improve L hip flex AROM to 95°  -     STG 3 Progress  Progressing;Ongoing  -     STG 4  Improve Tinetti balance test score to >/= 24/28 to reduce pt's fall risk  -     STG 4 Progress  Met;Goal Revised  -     STG 5  Improve 30 sec sit to stand test to 7 reps  -     STG 5 Progress  Ongoing  -        Time Calculation    PT Goal Re-Cert Due Date  05/25/21  -       User Key  (r) = Recorded By, (t) = Taken By, (c) = Cosigned By    Initials Name Provider Type    Graciela Polanco PTA Physical Therapy Assistant                         Time Calculation:   Start Time: 0850  Stop Time: 0930  Time Calculation (min): 40 min  Timed Charges  89629 - PT Therapeutic Exercise Minutes: 15  91999 - PT Manual Therapy Minutes: 15  Untimed Charges  PT Moist Heat Minutes: 10  Total Minutes  Timed Charges Total Minutes: 30  Untimed Charges Total Minutes: 10   Total Minutes: 40  Therapy Charges for Today     Code Description Service Date Service Provider Modifiers Qty    18438294863 HC PT THER PROC EA 15 MIN 5/24/2021 Graciela Cueva PTA GP 1    15683510790 HC PT MANUAL THERAPY EA 15 MIN 5/24/2021 Graciela Cueva PTA GP 1    69346502126 HC PT THER SUPP EA 15 MIN 5/24/2021 Graciela Cueva PTA GP 1                    Graciela Cueva PTA  5/24/2021

## 2021-05-26 ENCOUNTER — HOSPITAL ENCOUNTER (OUTPATIENT)
Dept: PHYSICAL THERAPY | Facility: HOSPITAL | Age: 71
Setting detail: THERAPIES SERIES
Discharge: HOME | End: 2021-05-26

## 2021-05-26 DIAGNOSIS — M25.552 LEFT HIP PAIN: Primary | ICD-10-CM

## 2021-05-26 DIAGNOSIS — R29.6 FREQUENT FALLS: ICD-10-CM

## 2021-05-26 PROCEDURE — 97112 NEUROMUSCULAR REEDUCATION: CPT | Performed by: PHYSICAL THERAPIST

## 2021-05-26 PROCEDURE — 97116 GAIT TRAINING THERAPY: CPT | Performed by: PHYSICAL THERAPIST

## 2021-05-26 PROCEDURE — 97110 THERAPEUTIC EXERCISES: CPT | Performed by: PHYSICAL THERAPIST

## 2021-05-26 NOTE — THERAPY DISCHARGE NOTE
Outpatient Physical Therapy Ortho Progress Note/Discharge Summary  [facility]     Patient Name: Laura West  : 1950  MRN: [id number]  Today's Date: 2021      Visit Date: 2021  Subjective Improvement: 30%  Attendance:   (12 approved until 2021)  Next MD Visit : 2021  Recert Date:  N/A        Therapy Diagnosis:  L Hip/LBP Pain  Visit Dx:    ICD-10-CM ICD-9-CM   1. Left hip pain  M25.552 719.45   2. Frequent falls  R29.6 V15.88       Patient Active Problem List   Diagnosis   • Pseudophakia   • Diabetes mellitus without complication (CMS/HCC)        Past Medical History:   Diagnosis Date   • Acute anterior uveitis     OD, improved      • Acute bronchitis    • Acute gouty arthropathy     left 1st MTP joint    • Acute sinusitis    • Artificial lens present     IN POSITION - OU   • Benign neoplasm of skin of eyelid      s/p excision hidrocystoma RLL   • Cellulitis     rt distal arm mild      • Common cold    • Contusion    • COPD (chronic obstructive pulmonary disease) (CMS/HCC)    • Cortical senile cataract     OS   • Cough    • Dysuria    • Gout    • Gouty arthropathy    • Hand pain    • Hyperlipidemia    • Hypertension    • Knee pain    • Posterior subcapsular polar age-related cataract     OS   • Type 2 diabetes with complication (CMS/HCC)    • Vertigo    • Vitreous floaters     asteroid hyalosis OS    • Vitreous opacities     asteroid OS           Past Surgical History:   Procedure Laterality Date   • CATARACT EXTRACTION  2014    Remove cataract, insert lens (Left eye.)   • CATARACT EXTRACTION  2014    Remove cataract, insert lens (Right eye.)   • CATARACT EXTRACTION Bilateral    • EXCISION LESION  2014    EYELID EXCISION LESION 23850 (Benign neoplasm of skin of eyelid)    • INJECTION OF MEDICATION  2012    KENALOG   • OTHER SURGICAL HISTORY  2014    OPTICAL BIOMETRY 09445 (1)      • PACEMAKER IMPLANTATION         PT Ortho     Row Name  05/26/21 0813       Subjective Comments    Subjective Comments  Pt arrived 13 minutes late.   -BS       Precautions and Contraindications    Precautions/Limitations  fall precautions  -BS    Precautions  pacemaker-no modalities  -BS       General ROM    GENERAL ROM COMMENTS  AROM: L hip flex 90°  -BS       MMT (Manual Muscle Testing)    General MMT Comments  L hip abd 3+/5   -BS      User Key  (r) = Recorded By, (t) = Taken By, (c) = Cosigned By    Initials Name Provider Type    Terrance Gates, PT Physical Therapist                      PT Assessment/Plan     Row Name 05/26/21 0813          PT Assessment    Assessment Comments  Patient met a functional plateau. Regression with gait mechanics, now more of a guarded step to gait pattern after hurting her back and L hip gardening recently. Improved gait mechanics with use of RW vs wooden cane she brought into the clinic. Pt met only 2 of 5 goals. Met a functional plateau. Pt to see MD today and recommended pt asked MD for a referral for a rollator to assist with household ambulation.   -BS        PT Plan    PT Plan Comments  D/c'd after last authorized session today. Met a functional plateau.   -BS       User Key  (r) = Recorded By, (t) = Taken By, (c) = Cosigned By    Initials Name Provider Type    Terrance Gates, PT Physical Therapist              OP Exercises     Row Name 05/26/21 0813             Subjective Comments    Subjective Comments  Pt arrived 13 minutes late.   -BS         Subjective Pain    Able to rate subjective pain?  yes  -BS      Pre-Treatment Pain Level  8  -BS      Post-Treatment Pain Level  6  -BS      Subjective Pain Comment  L hip  -BS         Exercise 1    Exercise Name 1  gait training x 75' close SBA with RW, vc's for sequencing with AD, step to gait pattern, occasional foot drag with R foot  -BS      Time 1  10'  -BS         Exercise 2    Exercise Name 2  Tinetti balance test  -BS      Time 2  20/28  -BS         Exercise 3    Exercise  "Name 3  30 sec STS test  -BS      Time 3  2 reps w/ B UE A  -BS         Exercise 4    Exercise Name 4  DKC S w/ strap  -BS      Sets 4  1  -BS      Reps 4  2  -BS      Time 4  30\" hold  -BS         Exercise 5    Exercise Name 5  SKC S w/ strap  -BS      Sets 5  1  -BS      Reps 5  1  -BS      Time 5  30\" hold  -BS      Additional Comments  bilat  -BS        User Key  (r) = Recorded By, (t) = Taken By, (c) = Cosigned By    Initials Name Provider Type    Terrance Gates, PT Physical Therapist                         PT OP Goals     Row Name 05/26/21 0800          PT Short Term Goals    STG Date to Achieve  05/25/21  -BS     STG 1  Pt independent with HEP for left hip strengthening and ROM  -BS     STG 1 Progress  Met  -BS     STG 2  Improve L hip abduction MMT to 4/5 or better  -BS     STG 2 Progress  Not Met  -BS     STG 3  Improve L hip flex AROM to 95°  -BS     STG 3 Progress  Not Met  -BS     STG 4  Improve Tinetti balance test score to >/= 24/28 to reduce pt's fall risk  -BS     STG 4 Progress  Not Met  -BS     STG 5  Improve 30 sec sit to stand test to 7 reps  -BS     STG 5 Progress  Not Met  -BS        Time Calculation    PT Goal Re-Cert Due Date  -- N/A  -BS       User Key  (r) = Recorded By, (t) = Taken By, (c) = Cosigned By    Initials Name Provider Type    Terrance Gates, PT Physical Therapist               Outcome Measure Options: Tinetti, 30 Second Chair Stand Test  30 Second Chair Stand Test  30 Second Chair Stand Test: 2 reps with B UE A  Tinetti Assessment  Tinetti Assessment: yes  Sitting Balance: Steady,safe  Arises: Able in 1 attempt  Attempts to Rise: Able in 1 attempt  Immediate Standing Balance (first 5 sec): Steady without support  Standing Balance: Narrow stance, without support  Sternal Nudge (feet close together): Steady  Eyes Closed (feet close together): Steady  Turning 360 Degrees- Steps: Discontinuous steps  Turning 360 Degrees- Steadiness: Steady  Sitting Down: Uses arms or not a " "smooth motion  Tinetti Balance Score: 14  Gait Initiation (immediate after told \"go\"): No hesitancy  Step Length- Right Swing: Right swing foot does not pass Left stance leg  Step Length- Left Swing: Left swing foot does not pass Right  Foot Clearance- Right Foot: Right foot completely clears floor  Foot Clearance- Left Foot: Left foot completely clears floor  Step Symmetry: Right and Left step length equal  Step Continuity: Steps appear continuous  Path (excursion): Mild/moderate deviation or use of aid  Trunk: Marked sway or uses device  Base of Support: Heels apart  Gait Score: 6  Tinetti Total Score: 20  Tinetti Assistive Device: other (see comments) (wooden cane)      Time Calculation:   Start Time: 0813  Stop Time: 0906  Time Calculation (min): 53 min  Total Timed Code Minutes- PT: 53 minute(s)  Therapy Charges for Today     Code Description Service Date Service Provider Modifiers Qty    68597175230 HC PT THER PROC EA 15 MIN 5/26/2021 Terrance Phelps, PT GP 2    01629404334  PT NEUROMUSC RE EDUCATION EA 15 MIN 5/26/2021 Terrance Phelps, PT GP 1    26832830949 HC GAIT TRAINING EA 15 MIN 5/26/2021 Terrance Phelps, PT GP 1          PT G-Codes  Outcome Measure Options: Tinetti, 30 Second Chair Stand Test  Tinetti Total Score: 20     OP PT Discharge Summary  Date of Discharge: 05/26/21  Reason for Discharge: Lack of progress, Maximum functional potential achieved  Outcomes Achieved: Patient able to partially acheive established goals  Discharge Destination: Home with home program      Terrance Phelps PT  5/26/2021       "

## 2021-06-10 ENCOUNTER — TRANSCRIBE ORDERS (OUTPATIENT)
Dept: LAB | Facility: OTHER | Age: 71
End: 2021-06-10

## 2021-06-10 DIAGNOSIS — R06.02 SHORTNESS OF BREATH: Primary | ICD-10-CM
